# Patient Record
Sex: FEMALE | Race: WHITE | NOT HISPANIC OR LATINO | Employment: FULL TIME | URBAN - NONMETROPOLITAN AREA
[De-identification: names, ages, dates, MRNs, and addresses within clinical notes are randomized per-mention and may not be internally consistent; named-entity substitution may affect disease eponyms.]

---

## 2017-04-03 ENCOUNTER — TRANSCRIBE ORDERS (OUTPATIENT)
Dept: ADMINISTRATIVE | Facility: HOSPITAL | Age: 57
End: 2017-04-03

## 2017-04-03 DIAGNOSIS — R42 DIZZINESS: Primary | ICD-10-CM

## 2017-04-11 ENCOUNTER — HOSPITAL ENCOUNTER (OUTPATIENT)
Dept: MRI IMAGING | Facility: HOSPITAL | Age: 57
Discharge: HOME | End: 2017-04-11
Attending: PSYCHIATRY & NEUROLOGY

## 2017-04-11 DIAGNOSIS — R42 DIZZINESS: ICD-10-CM

## 2017-09-28 ENCOUNTER — TRANSCRIBE ORDERS (OUTPATIENT)
Dept: ADMINISTRATIVE | Facility: HOSPITAL | Age: 57
End: 2017-09-28

## 2017-09-28 DIAGNOSIS — G93.9 BRAIN DISORDER: Primary | ICD-10-CM

## 2017-10-05 ENCOUNTER — HOSPITAL ENCOUNTER (OUTPATIENT)
Dept: CT IMAGING | Facility: HOSPITAL | Age: 57
Discharge: HOME | End: 2017-10-05
Attending: FAMILY MEDICINE | Admitting: FAMILY MEDICINE

## 2017-10-05 DIAGNOSIS — G93.9 BRAIN DISORDER: ICD-10-CM

## 2017-10-05 LAB — CREAT BLDA-MCNC: 0.8 MG/DL (ref 0.6–1.3)

## 2017-10-05 PROCEDURE — 70470 CT HEAD/BRAIN W/O & W/DYE: CPT | Performed by: RADIOLOGY

## 2017-10-05 PROCEDURE — 82565 ASSAY OF CREATININE: CPT

## 2017-10-05 PROCEDURE — 70470 CT HEAD/BRAIN W/O & W/DYE: CPT

## 2017-10-05 PROCEDURE — 0 IOPAMIDOL 61 % SOLUTION: Performed by: FAMILY MEDICINE

## 2017-10-05 RX ADMIN — IOPAMIDOL 100 ML: 612 INJECTION, SOLUTION INTRAVENOUS at 15:15

## 2018-02-04 ENCOUNTER — HOSPITAL ENCOUNTER (EMERGENCY)
Facility: HOSPITAL | Age: 58
Discharge: HOME | End: 2018-02-04
Attending: EMERGENCY MEDICINE | Admitting: EMERGENCY MEDICINE

## 2018-02-04 ENCOUNTER — APPOINTMENT (OUTPATIENT)
Dept: CT IMAGING | Facility: HOSPITAL | Age: 58
End: 2018-02-04

## 2018-02-04 ENCOUNTER — APPOINTMENT (OUTPATIENT)
Dept: GENERAL RADIOLOGY | Facility: HOSPITAL | Age: 58
End: 2018-02-04

## 2018-02-04 VITALS
TEMPERATURE: 98.4 F | SYSTOLIC BLOOD PRESSURE: 128 MMHG | RESPIRATION RATE: 18 BRPM | OXYGEN SATURATION: 96 % | HEIGHT: 67 IN | WEIGHT: 265 LBS | BODY MASS INDEX: 41.59 KG/M2 | DIASTOLIC BLOOD PRESSURE: 85 MMHG | HEART RATE: 103 BPM

## 2018-02-04 DIAGNOSIS — J11.1 INFLUENZA: Primary | ICD-10-CM

## 2018-02-04 LAB
A-A DO2: 29.6 MMHG (ref 0–300)
ALBUMIN SERPL-MCNC: 5 G/DL (ref 3.5–5)
ALBUMIN/GLOB SERPL: 1.7 G/DL (ref 1.5–2.5)
ALP SERPL-CCNC: 75 U/L (ref 35–104)
ALT SERPL W P-5'-P-CCNC: 32 U/L (ref 10–36)
ANION GAP SERPL CALCULATED.3IONS-SCNC: 7.7 MMOL/L (ref 3.6–11.2)
ARTERIAL PATENCY WRIST A: POSITIVE
AST SERPL-CCNC: 58 U/L (ref 10–30)
ATMOSPHERIC PRESS: 720 MMHG
BASE EXCESS BLDA CALC-SCNC: 4.1 MMOL/L
BASOPHILS # BLD AUTO: 0.02 10*3/MM3 (ref 0–0.3)
BASOPHILS NFR BLD AUTO: 0.3 % (ref 0–2)
BDY SITE: ABNORMAL
BILIRUB SERPL-MCNC: 0.6 MG/DL (ref 0.2–1.8)
BODY TEMPERATURE: 98.6 C
BUN BLD-MCNC: 12 MG/DL (ref 7–21)
BUN/CREAT SERPL: 14.3 (ref 7–25)
CALCIUM SPEC-SCNC: 9.5 MG/DL (ref 7.7–10)
CHLORIDE SERPL-SCNC: 99 MMOL/L (ref 99–112)
CO2 SERPL-SCNC: 28.3 MMOL/L (ref 24.3–31.9)
COHGB MFR BLD: 1.5 % (ref 0–5)
CREAT BLD-MCNC: 0.84 MG/DL (ref 0.43–1.29)
DEPRECATED RDW RBC AUTO: 49.4 FL (ref 37–54)
EOSINOPHIL # BLD AUTO: 0.15 10*3/MM3 (ref 0–0.7)
EOSINOPHIL NFR BLD AUTO: 2.3 % (ref 0–5)
ERYTHROCYTE [DISTWIDTH] IN BLOOD BY AUTOMATED COUNT: 15.5 % (ref 11.5–14.5)
FLUAV AG NPH QL: POSITIVE
FLUBV AG NPH QL IA: NEGATIVE
GFR SERPL CREATININE-BSD FRML MDRD: 70 ML/MIN/1.73
GLOBULIN UR ELPH-MCNC: 2.9 GM/DL
GLUCOSE BLD-MCNC: 94 MG/DL (ref 70–110)
HCO3 BLDA-SCNC: 28.9 MMOL/L (ref 22–26)
HCT VFR BLD AUTO: 44.1 % (ref 37–47)
HCT VFR BLD CALC: 43 % (ref 37–47)
HGB BLD-MCNC: 14.6 G/DL (ref 12–16)
HGB BLDA-MCNC: 14.6 G/DL (ref 12–16)
HOROWITZ INDEX BLD+IHG-RTO: 21 %
IMM GRANULOCYTES # BLD: 0.04 10*3/MM3 (ref 0–0.03)
IMM GRANULOCYTES NFR BLD: 0.6 % (ref 0–0.5)
LYMPHOCYTES # BLD AUTO: 1 10*3/MM3 (ref 1–3)
LYMPHOCYTES NFR BLD AUTO: 15.5 % (ref 21–51)
MCH RBC QN AUTO: 28.9 PG (ref 27–33)
MCHC RBC AUTO-ENTMCNC: 33.1 G/DL (ref 33–37)
MCV RBC AUTO: 87.2 FL (ref 80–94)
METHGB BLD QL: 0.3 % (ref 0–3)
MODALITY: ABNORMAL
MONOCYTES # BLD AUTO: 0.91 10*3/MM3 (ref 0.1–0.9)
MONOCYTES NFR BLD AUTO: 14.1 % (ref 0–10)
NEUTROPHILS # BLD AUTO: 4.35 10*3/MM3 (ref 1.4–6.5)
NEUTROPHILS NFR BLD AUTO: 67.2 % (ref 30–70)
OSMOLALITY SERPL CALC.SUM OF ELEC: 269.6 MOSM/KG (ref 273–305)
OXYHGB MFR BLDV: 90.1 % (ref 85–100)
PCO2 BLDA: 43.6 MM HG (ref 35–45)
PH BLDA: 7.44 PH UNITS (ref 7.35–7.45)
PLATELET # BLD AUTO: 162 10*3/MM3 (ref 130–400)
PMV BLD AUTO: 11.6 FL (ref 6–10)
PO2 BLDA: 59.5 MM HG (ref 80–100)
POTASSIUM BLD-SCNC: 4.5 MMOL/L (ref 3.5–5.3)
PROT SERPL-MCNC: 7.9 G/DL (ref 6–8)
RBC # BLD AUTO: 5.06 10*6/MM3 (ref 4.2–5.4)
SAO2 % BLDCOA: 91.8 % (ref 90–100)
SODIUM BLD-SCNC: 135 MMOL/L (ref 135–153)
WBC NRBC COR # BLD: 6.47 10*3/MM3 (ref 4.5–12.5)

## 2018-02-04 PROCEDURE — 93005 ELECTROCARDIOGRAM TRACING: CPT | Performed by: EMERGENCY MEDICINE

## 2018-02-04 PROCEDURE — 83050 HGB METHEMOGLOBIN QUAN: CPT | Performed by: EMERGENCY MEDICINE

## 2018-02-04 PROCEDURE — 82805 BLOOD GASES W/O2 SATURATION: CPT | Performed by: EMERGENCY MEDICINE

## 2018-02-04 PROCEDURE — 87804 INFLUENZA ASSAY W/OPTIC: CPT | Performed by: EMERGENCY MEDICINE

## 2018-02-04 PROCEDURE — 71046 X-RAY EXAM CHEST 2 VIEWS: CPT

## 2018-02-04 PROCEDURE — 70450 CT HEAD/BRAIN W/O DYE: CPT | Performed by: RADIOLOGY

## 2018-02-04 PROCEDURE — 70486 CT MAXILLOFACIAL W/O DYE: CPT

## 2018-02-04 PROCEDURE — 25010000002 AZITHROMYCIN: Performed by: EMERGENCY MEDICINE

## 2018-02-04 PROCEDURE — 80053 COMPREHEN METABOLIC PANEL: CPT | Performed by: EMERGENCY MEDICINE

## 2018-02-04 PROCEDURE — 36415 COLL VENOUS BLD VENIPUNCTURE: CPT

## 2018-02-04 PROCEDURE — 96365 THER/PROPH/DIAG IV INF INIT: CPT

## 2018-02-04 PROCEDURE — 36600 WITHDRAWAL OF ARTERIAL BLOOD: CPT | Performed by: EMERGENCY MEDICINE

## 2018-02-04 PROCEDURE — 99283 EMERGENCY DEPT VISIT LOW MDM: CPT

## 2018-02-04 PROCEDURE — 87040 BLOOD CULTURE FOR BACTERIA: CPT | Performed by: EMERGENCY MEDICINE

## 2018-02-04 PROCEDURE — 71046 X-RAY EXAM CHEST 2 VIEWS: CPT | Performed by: RADIOLOGY

## 2018-02-04 PROCEDURE — 82375 ASSAY CARBOXYHB QUANT: CPT | Performed by: EMERGENCY MEDICINE

## 2018-02-04 PROCEDURE — 85025 COMPLETE CBC W/AUTO DIFF WBC: CPT | Performed by: EMERGENCY MEDICINE

## 2018-02-04 PROCEDURE — 70450 CT HEAD/BRAIN W/O DYE: CPT

## 2018-02-04 PROCEDURE — 70486 CT MAXILLOFACIAL W/O DYE: CPT | Performed by: RADIOLOGY

## 2018-02-04 RX ORDER — BUMETANIDE 1 MG/1
1 TABLET ORAL DAILY
COMMUNITY
End: 2018-11-01 | Stop reason: HOSPADM

## 2018-02-04 RX ORDER — SODIUM CHLORIDE 0.9 % (FLUSH) 0.9 %
10 SYRINGE (ML) INJECTION AS NEEDED
Status: DISCONTINUED | OUTPATIENT
Start: 2018-02-04 | End: 2018-02-04 | Stop reason: HOSPADM

## 2018-02-04 RX ORDER — AMOXICILLIN AND CLAVULANATE POTASSIUM 875; 125 MG/1; MG/1
1 TABLET, FILM COATED ORAL 2 TIMES DAILY
COMMUNITY
End: 2018-11-01 | Stop reason: HOSPADM

## 2018-02-04 RX ORDER — TRAZODONE HYDROCHLORIDE 100 MG/1
100 TABLET ORAL NIGHTLY
COMMUNITY
End: 2021-11-17

## 2018-02-04 RX ORDER — LEVETIRACETAM 500 MG/1
500 TABLET ORAL 2 TIMES DAILY
COMMUNITY
End: 2018-11-01 | Stop reason: HOSPADM

## 2018-02-04 RX ORDER — GUAIFENESIN AND CODEINE PHOSPHATE 100; 10 MG/5ML; MG/5ML
10 SOLUTION ORAL 4 TIMES DAILY PRN
Qty: 120 ML | Refills: 0 | Status: SHIPPED | OUTPATIENT
Start: 2018-02-04 | End: 2018-11-01 | Stop reason: HOSPADM

## 2018-02-04 RX ADMIN — AZITHROMYCIN 500 MG: 500 INJECTION, POWDER, LYOPHILIZED, FOR SOLUTION INTRAVENOUS at 09:47

## 2018-02-04 NOTE — ED NOTES
EKG performed by [facility] at 0821 and shown to Dr Hardin.      Britt Mathew  02/04/18 0849

## 2018-02-04 NOTE — ED PROVIDER NOTES
Subjective   History of Present Illness  57-year-old white female complains of headache and cough.  Patient states that she has bilateral facial pain due to sinus infection.  She saw her doctor and was started on amoxicillin and nasal decongestant by the NP 3 days ago.  Her symptoms have continued to worsen despite treatment.  She complains of frequent cough and now has pain in the top of her head.  She states that she's had a benign brain tumor and she is concerned about this in light of her new headache.  She had a negative flu test at the doctor's office.  She had a fever at home 2 days ago.  She denies any chest pain, neck pain or stiffness, vomiting, diarrhea or other complaints.  Review of Systems   All other systems reviewed and are negative.      Past Medical History:   Diagnosis Date   • Anxiety    • Brain tumor    • Brain tumor    • C. difficile colitis    • Disease of thyroid gland     s/p thyroidectomy    • Dyspnea    • GERD (gastroesophageal reflux disease)    • Hemorrhoids    • Hypertension    • Kidney infection     treated recently-completed all antibiotics    • Loss, vision, sudden     episodes of vision loss-'black out vision'-reason unknown    • Near syncope     reason unknown    • Nerve pain    • Rash     this summer-rash of unknown origin-has used a cream prn    • Skin rash     BLE/BUE with scattered red bumps, pt states present since Spring 2016   • Swelling of ankle    • Vitamin D deficiency        Allergies   Allergen Reactions   • Poison Ivy Extract [Poison Ivy Extract] Other (See Comments)     Skin blisters     • Poison Oak Extract [Poison Oak Extract] Other (See Comments)     Skin blisters        Past Surgical History:   Procedure Laterality Date   •  SECTION     • COLONOSCOPY W/ POLYPECTOMY     • HYSTERECTOMY     • OTHER SURGICAL HISTORY      right lateral femoral cutaneous nerve transection at the inguinal ligament    • PERONEAL NERVE DECOMPRESSION Right 2016     Procedure: RIGHT LATERAL FEMORAL CUTANEOUS NERVE TRANSECTION AT LIGUINAL LIGAMENT;  Surgeon: Ethan Tate MD;  Location: [facility] OR;  Service:    • THYROID SURGERY      removed because 'large'   • TONSILLECTOMY         History reviewed. No pertinent family history.    Social History     Social History   • Marital status:      Spouse name: N/A   • Number of children: N/A   • Years of education: N/A     Social History Main Topics   • Smoking status: Never Smoker   • Smokeless tobacco: Never Used   • Alcohol use No   • Drug use: No   • Sexual activity: Defer     Other Topics Concern   • None     Social History Narrative   • None           Objective   Physical Exam   Constitutional: She is oriented to person, place, and time. She appears well-developed and well-nourished.   HENT:   Head: Normocephalic and atraumatic.   Right Ear: External ear and ear canal normal. A middle ear effusion is present.   Left Ear: Tympanic membrane, external ear and ear canal normal.   Mouth/Throat: Oropharynx is clear and moist.   Cardiovascular: Normal rate, regular rhythm and normal heart sounds.  Exam reveals no gallop and no friction rub.    No murmur heard.  Pulmonary/Chest: Effort normal and breath sounds normal. No respiratory distress. She has no wheezes. She has no rales.   Abdominal: Soft. Bowel sounds are normal. She exhibits no distension. There is no tenderness. There is no rebound and no guarding.   Musculoskeletal: Normal range of motion. She exhibits no edema.   Neurological: She is alert and oriented to person, place, and time.   Skin: Skin is warm and dry.   Psychiatric: She has a normal mood and affect.   Nursing note and vitals reviewed.      Procedures  Results for orders placed or performed during the hospital encounter of 02/04/18   Influenza Antigen, Rapid - Swab, Nasopharynx   Result Value Ref Range    Influenza A Ag, EIA Positive (A) Negative    Influenza B Ag, EIA Negative Negative   Comprehensive  Metabolic Panel   Result Value Ref Range    Glucose 94 70 - 110 mg/dL    BUN 12 7 - 21 mg/dL    Creatinine 0.84 0.43 - 1.29 mg/dL    Sodium 135 135 - 153 mmol/L    Potassium 4.5 3.5 - 5.3 mmol/L    Chloride 99 99 - 112 mmol/L    CO2 28.3 24.3 - 31.9 mmol/L    Calcium 9.5 7.7 - 10.0 mg/dL    Total Protein 7.9 6.0 - 8.0 g/dL    Albumin 5.00 3.50 - 5.00 g/dL    ALT (SGPT) 32 10 - 36 U/L    AST (SGOT) 58 (H) 10 - 30 U/L    Alkaline Phosphatase 75 35 - 104 U/L    Total Bilirubin 0.6 0.2 - 1.8 mg/dL    eGFR Non African Amer 70 >60 mL/min/1.73    Globulin 2.9 gm/dL    A/G Ratio 1.7 1.5 - 2.5 g/dL    BUN/Creatinine Ratio 14.3 7.0 - 25.0    Anion Gap 7.7 3.6 - 11.2 mmol/L   Blood Gas, Arterial   Result Value Ref Range    Site Arterial: left radial     Tyler's Test Positive     pH, Arterial 7.439 7.350 - 7.450 pH units    pCO2, Arterial 43.6 35.0 - 45.0 mm Hg    pO2, Arterial 59.5 (L) 80.0 - 100.0 mm Hg    HCO3, Arterial 28.9 (H) 22.0 - 26.0 mmol/L    Base Excess, Arterial 4.1 mmol/L    O2 Saturation, Arterial 91.8 90.0 - 100.0 %    Hemoglobin, Blood Gas 14.6 12 - 16 g/dL    Hematocrit, Blood Gas 43.0 37.0 - 47.0 %    Oxyhemoglobin 90.1 85 - 100 %    Methemoglobin 0.30 0.00 - 3.00 %    Carboxyhemoglobin 1.5 0 - 5 %    A-a Gradiant 29.6 0.0 - 300.0 mmHg    Temperature 98.6 C    Barometric Pressure for Blood Gas 720 mmHg    Modality Nasal Cannula     FIO2 21 %   CBC Auto Differential   Result Value Ref Range    WBC 6.47 4.50 - 12.50 10*3/mm3    RBC 5.06 4.20 - 5.40 10*6/mm3    Hemoglobin 14.6 12.0 - 16.0 g/dL    Hematocrit 44.1 37.0 - 47.0 %    MCV 87.2 80.0 - 94.0 fL    MCH 28.9 27.0 - 33.0 pg    MCHC 33.1 33.0 - 37.0 g/dL    RDW 15.5 (H) 11.5 - 14.5 %    RDW-SD 49.4 37.0 - 54.0 fl    MPV 11.6 (H) 6.0 - 10.0 fL    Platelets 162 130 - 400 10*3/mm3    Neutrophil % 67.2 30.0 - 70.0 %    Lymphocyte % 15.5 (L) 21.0 - 51.0 %    Monocyte % 14.1 (H) 0.0 - 10.0 %    Eosinophil % 2.3 0.0 - 5.0 %    Basophil % 0.3 0.0 - 2.0 %    Immature  Grans % 0.6 (H) 0.0 - 0.5 %    Neutrophils, Absolute 4.35 1.40 - 6.50 10*3/mm3    Lymphocytes, Absolute 1.00 1.00 - 3.00 10*3/mm3    Monocytes, Absolute 0.91 (H) 0.10 - 0.90 10*3/mm3    Eosinophils, Absolute 0.15 0.00 - 0.70 10*3/mm3    Basophils, Absolute 0.02 0.00 - 0.30 10*3/mm3    Immature Grans, Absolute 0.04 (H) 0.00 - 0.03 10*3/mm3   Osmolality, Calculated   Result Value Ref Range    Osmolality Calc 269.6 (L) 273.0 - 305.0 mOsm/kg     Xr Chest 2 View    Result Date: 2/4/2018  Narrative: EXAMINATION: XR CHEST 2 VW-  CLINICAL INDICATION:     cough, fever  TECHNIQUE:  XR CHEST 2 VW-  COMPARISON: NONE  FINDINGS: Lungs are aerated. Heart and mediastinal contours are unremarkable. No pneumothorax. No pleural effusion. No acute osseous findings.         Impression: No radiographic evidence of acute cardiac or pulmonary disease.  This report was finalized on 2/4/2018 9:33 AM by Dr. Tucker Kuhn MD.      Ct Head Without Contrast    Result Date: 2/4/2018  Narrative: EXAMINATION: CT HEAD WO CONTRAST-  CLINICAL INDICATION:     ha  COMPARISON:    10/05/2017  Technique: Multiple CT axial images were obtained through the level of the brain without IV contrast administration. Reformatted images in the coronal and/or sagittal plane(s) were generated from the axial data set to facilitate diagnostic accuracy and/or surgical planning.  Radiation dose reduction techniques were utilized per ALARA protocol. Automated exposure control was initiated through either or CareDose or DoseRight software packages by  protocol.   DOSE (DLP mGy-cm): 1173.4 mGy.cm  FINDINGS:   No acute intracranial abnormality. No midline shift or mass effect. No hydrocephalus or intracranial hemorrhage. There is no CT evidence of acute vascular territory infarct.  Bone windows show no acute osseous abnormality.      Impression: No CT evidence of acute intracranial abnormality.  This report was finalized on 2/4/2018 9:36 AM by Dr. Tucker Kuhn  MD.      Ct Sinus Without Contrast    Result Date: 2/4/2018  Narrative: EXAMINATION: CT SINUS WO CONTRAST-  Technique: Multiple CT coronal images were obtained through the level of the paranasal sinuses.  Radiation dose reduction techniques were utilized per ALARA protocol. Automated exposure control was initiated through either or SimpleCrew or DoseRigEximSoft-Trianz software packages by  protocol.   263.7 mGy.cm  CLINICAL INDICATION: Cough fever.  COMPARISON:    None.  FINDINGS: No air-fluid levels of the paranasal sinuses to indicate acute sinusitis. Some underdevelopment of the frontal sinuses noted constituting anatomic variant. Minimal degree of bony nasal septal deviation to the right. The orbits are symmetric in both globes appear intact. No destructive osseous changes. Mastoid air cells are clear. No significant mucoperiosteal thickening identified. The soft tissues are within normal limits.      Impression: Unremarkable examination of the sinuses. Other nonacute findings as above.  This report was finalized on 2/4/2018 9:35 AM by Dr. Tucker Kuhn MD.             ED Course  ED Course   Value Comment By Time   ECG 12 Lead Normal sinus rhythm.  Rate 100.  Q waves in lead III.  No ST elevations or depressions. Dawson Hardin MD 02/04 1006                  MDM  Number of Diagnoses or Management Options  Influenza:      Amount and/or Complexity of Data Reviewed  Clinical lab tests: reviewed  Tests in the radiology section of CPT®: reviewed  Tests in the medicine section of CPT®: reviewed  Independent visualization of images, tracings, or specimens: yes    Risk of Complications, Morbidity, and/or Mortality  Presenting problems: high  Diagnostic procedures: high  Management options: moderate        Final diagnoses:   Influenza            Dawson Hardin MD  02/04/18 1030

## 2018-02-09 LAB
BACTERIA SPEC AEROBE CULT: NORMAL
BACTERIA SPEC AEROBE CULT: NORMAL

## 2018-10-30 ENCOUNTER — APPOINTMENT (OUTPATIENT)
Dept: CT IMAGING | Facility: HOSPITAL | Age: 58
End: 2018-10-30

## 2018-10-30 ENCOUNTER — APPOINTMENT (OUTPATIENT)
Dept: GENERAL RADIOLOGY | Facility: HOSPITAL | Age: 58
End: 2018-10-30

## 2018-10-30 ENCOUNTER — HOSPITAL ENCOUNTER (EMERGENCY)
Facility: HOSPITAL | Age: 58
Discharge: ANOTHER HOSPITAL | End: 2018-10-30
Attending: EMERGENCY MEDICINE | Admitting: EMERGENCY MEDICINE

## 2018-10-30 ENCOUNTER — HOSPITAL ENCOUNTER (OUTPATIENT)
Facility: HOSPITAL | Age: 58
Setting detail: OBSERVATION
Discharge: HOME | End: 2018-11-01
Attending: FAMILY MEDICINE | Admitting: HOSPITALIST

## 2018-10-30 ENCOUNTER — APPOINTMENT (OUTPATIENT)
Dept: MRI IMAGING | Facility: HOSPITAL | Age: 58
End: 2018-10-30

## 2018-10-30 VITALS
SYSTOLIC BLOOD PRESSURE: 145 MMHG | HEART RATE: 78 BPM | TEMPERATURE: 97.4 F | OXYGEN SATURATION: 99 % | HEIGHT: 67 IN | RESPIRATION RATE: 17 BRPM | WEIGHT: 265 LBS | DIASTOLIC BLOOD PRESSURE: 88 MMHG | BODY MASS INDEX: 41.59 KG/M2

## 2018-10-30 DIAGNOSIS — G43.109 COMPLICATED MIGRAINE: Primary | ICD-10-CM

## 2018-10-30 DIAGNOSIS — R29.90 STROKE-LIKE SYMPTOMS: Primary | ICD-10-CM

## 2018-10-30 PROBLEM — R56.9 SEIZURE: Status: ACTIVE | Noted: 2018-10-30

## 2018-10-30 PROBLEM — E03.9 HYPOTHYROIDISM: Status: ACTIVE | Noted: 2018-10-30

## 2018-10-30 PROBLEM — R51.9 HEADACHE: Status: ACTIVE | Noted: 2018-10-30

## 2018-10-30 LAB
ALBUMIN SERPL-MCNC: 4.8 G/DL (ref 3.5–5)
ALBUMIN/GLOB SERPL: 1.8 G/DL (ref 1.5–2.5)
ALP SERPL-CCNC: 101 U/L (ref 35–104)
ALT SERPL W P-5'-P-CCNC: 40 U/L (ref 10–36)
ANION GAP SERPL CALCULATED.3IONS-SCNC: 8.4 MMOL/L (ref 3.6–11.2)
APTT PPP: 29.2 SECONDS (ref 23.8–36.1)
AST SERPL-CCNC: 31 U/L (ref 10–30)
BACTERIA UR QL AUTO: NORMAL /HPF
BASOPHILS # BLD AUTO: 0.05 10*3/MM3 (ref 0–0.3)
BASOPHILS NFR BLD AUTO: 0.5 % (ref 0–2)
BILIRUB SERPL-MCNC: 0.5 MG/DL (ref 0.2–1.8)
BILIRUB UR QL STRIP: NEGATIVE
BUN BLD-MCNC: 14 MG/DL (ref 7–21)
BUN/CREAT SERPL: 18.4 (ref 7–25)
CALCIUM SPEC-SCNC: 9 MG/DL (ref 7.7–10)
CHLORIDE SERPL-SCNC: 105 MMOL/L (ref 99–112)
CLARITY UR: CLEAR
CO2 SERPL-SCNC: 28.6 MMOL/L (ref 24.3–31.9)
COLOR UR: YELLOW
CREAT BLD-MCNC: 0.76 MG/DL (ref 0.43–1.29)
DEPRECATED RDW RBC AUTO: 49.1 FL (ref 37–54)
EOSINOPHIL # BLD AUTO: 0.19 10*3/MM3 (ref 0–0.7)
EOSINOPHIL NFR BLD AUTO: 2.1 % (ref 0–5)
ERYTHROCYTE [DISTWIDTH] IN BLOOD BY AUTOMATED COUNT: 15.4 % (ref 11.5–14.5)
GFR SERPL CREATININE-BSD FRML MDRD: 78 ML/MIN/1.73
GLOBULIN UR ELPH-MCNC: 2.6 GM/DL
GLUCOSE BLD-MCNC: 127 MG/DL (ref 70–110)
GLUCOSE UR STRIP-MCNC: NEGATIVE MG/DL
HCT VFR BLD AUTO: 42.1 % (ref 37–47)
HGB BLD-MCNC: 13.1 G/DL (ref 12–16)
HGB UR QL STRIP.AUTO: NEGATIVE
HYALINE CASTS UR QL AUTO: NORMAL /LPF
IMM GRANULOCYTES # BLD: 0.05 10*3/MM3 (ref 0–0.03)
IMM GRANULOCYTES NFR BLD: 0.5 % (ref 0–0.5)
INR PPP: 0.93 (ref 0.9–1.1)
KETONES UR QL STRIP: NEGATIVE
LEUKOCYTE ESTERASE UR QL STRIP.AUTO: ABNORMAL
LIPASE SERPL-CCNC: 37 U/L (ref 13–60)
LYMPHOCYTES # BLD AUTO: 1.73 10*3/MM3 (ref 1–3)
LYMPHOCYTES NFR BLD AUTO: 18.9 % (ref 21–51)
MCH RBC QN AUTO: 27.4 PG (ref 27–33)
MCHC RBC AUTO-ENTMCNC: 31.1 G/DL (ref 33–37)
MCV RBC AUTO: 88.1 FL (ref 80–94)
MONOCYTES # BLD AUTO: 0.56 10*3/MM3 (ref 0.1–0.9)
MONOCYTES NFR BLD AUTO: 6.1 % (ref 0–10)
NEUTROPHILS # BLD AUTO: 6.58 10*3/MM3 (ref 1.4–6.5)
NEUTROPHILS NFR BLD AUTO: 71.9 % (ref 30–70)
NITRITE UR QL STRIP: NEGATIVE
OSMOLALITY SERPL CALC.SUM OF ELEC: 285.2 MOSM/KG (ref 273–305)
PH UR STRIP.AUTO: <=5 [PH] (ref 5–8)
PLATELET # BLD AUTO: 205 10*3/MM3 (ref 130–400)
PMV BLD AUTO: 10.7 FL (ref 6–10)
POTASSIUM BLD-SCNC: 3.6 MMOL/L (ref 3.5–5.3)
PROT SERPL-MCNC: 7.4 G/DL (ref 6–8)
PROT UR QL STRIP: NEGATIVE
PROTHROMBIN TIME: 12.7 SECONDS (ref 11–15.4)
RBC # BLD AUTO: 4.78 10*6/MM3 (ref 4.2–5.4)
RBC # UR: NORMAL /HPF
REF LAB TEST METHOD: NORMAL
SODIUM BLD-SCNC: 142 MMOL/L (ref 135–153)
SP GR UR STRIP: 1.01 (ref 1–1.03)
SQUAMOUS #/AREA URNS HPF: NORMAL /HPF
TROPONIN I SERPL-MCNC: <0.006 NG/ML
UROBILINOGEN UR QL STRIP: ABNORMAL
WBC NRBC COR # BLD: 9.16 10*3/MM3 (ref 4.5–12.5)
WBC UR QL AUTO: NORMAL /HPF

## 2018-10-30 PROCEDURE — 85610 PROTHROMBIN TIME: CPT | Performed by: EMERGENCY MEDICINE

## 2018-10-30 PROCEDURE — 83690 ASSAY OF LIPASE: CPT | Performed by: EMERGENCY MEDICINE

## 2018-10-30 PROCEDURE — 96372 THER/PROPH/DIAG INJ SC/IM: CPT

## 2018-10-30 PROCEDURE — 71045 X-RAY EXAM CHEST 1 VIEW: CPT | Performed by: RADIOLOGY

## 2018-10-30 PROCEDURE — 70450 CT HEAD/BRAIN W/O DYE: CPT | Performed by: RADIOLOGY

## 2018-10-30 PROCEDURE — 85025 COMPLETE CBC W/AUTO DIFF WBC: CPT | Performed by: EMERGENCY MEDICINE

## 2018-10-30 PROCEDURE — 85730 THROMBOPLASTIN TIME PARTIAL: CPT | Performed by: EMERGENCY MEDICINE

## 2018-10-30 PROCEDURE — 96361 HYDRATE IV INFUSION ADD-ON: CPT

## 2018-10-30 PROCEDURE — 25010000002 HEPARIN (PORCINE) PER 1000 UNITS: Performed by: HOSPITALIST

## 2018-10-30 PROCEDURE — 93010 ELECTROCARDIOGRAM REPORT: CPT | Performed by: INTERNAL MEDICINE

## 2018-10-30 PROCEDURE — 96374 THER/PROPH/DIAG INJ IV PUSH: CPT

## 2018-10-30 PROCEDURE — 80053 COMPREHEN METABOLIC PANEL: CPT | Performed by: EMERGENCY MEDICINE

## 2018-10-30 PROCEDURE — G0378 HOSPITAL OBSERVATION PER HR: HCPCS

## 2018-10-30 PROCEDURE — 25010000002 ONDANSETRON PER 1 MG: Performed by: EMERGENCY MEDICINE

## 2018-10-30 PROCEDURE — 25010000002 MORPHINE PER 10 MG: Performed by: EMERGENCY MEDICINE

## 2018-10-30 PROCEDURE — 25010000002 LORAZEPAM PER 2 MG: Performed by: NURSE PRACTITIONER

## 2018-10-30 PROCEDURE — 99285 EMERGENCY DEPT VISIT HI MDM: CPT

## 2018-10-30 PROCEDURE — 81001 URINALYSIS AUTO W/SCOPE: CPT | Performed by: EMERGENCY MEDICINE

## 2018-10-30 PROCEDURE — 84484 ASSAY OF TROPONIN QUANT: CPT | Performed by: EMERGENCY MEDICINE

## 2018-10-30 PROCEDURE — 70450 CT HEAD/BRAIN W/O DYE: CPT

## 2018-10-30 PROCEDURE — 96375 TX/PRO/DX INJ NEW DRUG ADDON: CPT

## 2018-10-30 PROCEDURE — 25010000002 LORAZEPAM PER 2 MG: Performed by: HOSPITALIST

## 2018-10-30 PROCEDURE — 71045 X-RAY EXAM CHEST 1 VIEW: CPT

## 2018-10-30 PROCEDURE — 70553 MRI BRAIN STEM W/O & W/DYE: CPT

## 2018-10-30 PROCEDURE — G0379 DIRECT REFER HOSPITAL OBSERV: HCPCS

## 2018-10-30 PROCEDURE — 99220 PR INITIAL OBSERVATION CARE/DAY 70 MINUTES: CPT | Performed by: HOSPITALIST

## 2018-10-30 PROCEDURE — 93005 ELECTROCARDIOGRAM TRACING: CPT | Performed by: EMERGENCY MEDICINE

## 2018-10-30 RX ORDER — HEPARIN SODIUM 5000 [USP'U]/ML
5000 INJECTION, SOLUTION INTRAVENOUS; SUBCUTANEOUS EVERY 8 HOURS SCHEDULED
Status: DISCONTINUED | OUTPATIENT
Start: 2018-10-30 | End: 2018-11-01 | Stop reason: HOSPADM

## 2018-10-30 RX ORDER — SODIUM CHLORIDE 9 MG/ML
100 INJECTION, SOLUTION INTRAVENOUS CONTINUOUS
Status: DISCONTINUED | OUTPATIENT
Start: 2018-10-30 | End: 2018-10-31

## 2018-10-30 RX ORDER — SODIUM CHLORIDE 0.9 % (FLUSH) 0.9 %
3-10 SYRINGE (ML) INJECTION AS NEEDED
Status: DISCONTINUED | OUTPATIENT
Start: 2018-10-30 | End: 2018-11-01 | Stop reason: HOSPADM

## 2018-10-30 RX ORDER — PANTOPRAZOLE SODIUM 40 MG/1
40 TABLET, DELAYED RELEASE ORAL EVERY MORNING
Status: DISCONTINUED | OUTPATIENT
Start: 2018-10-31 | End: 2018-10-31

## 2018-10-30 RX ORDER — LORAZEPAM 2 MG/ML
0.5 INJECTION INTRAMUSCULAR ONCE
Status: COMPLETED | OUTPATIENT
Start: 2018-10-30 | End: 2018-10-30

## 2018-10-30 RX ORDER — MORPHINE SULFATE 2 MG/ML
2 INJECTION, SOLUTION INTRAMUSCULAR; INTRAVENOUS ONCE
Status: COMPLETED | OUTPATIENT
Start: 2018-10-30 | End: 2018-10-30

## 2018-10-30 RX ORDER — MORPHINE SULFATE 2 MG/ML
1 INJECTION, SOLUTION INTRAMUSCULAR; INTRAVENOUS EVERY 4 HOURS PRN
Status: DISCONTINUED | OUTPATIENT
Start: 2018-10-30 | End: 2018-11-01 | Stop reason: HOSPADM

## 2018-10-30 RX ORDER — LORAZEPAM 2 MG/ML
0.5 INJECTION INTRAMUSCULAR EVERY 4 HOURS PRN
Status: DISCONTINUED | OUTPATIENT
Start: 2018-10-30 | End: 2018-11-01 | Stop reason: HOSPADM

## 2018-10-30 RX ORDER — ASPIRIN 81 MG/1
324 TABLET, CHEWABLE ORAL ONCE
Status: COMPLETED | OUTPATIENT
Start: 2018-10-30 | End: 2018-10-30

## 2018-10-30 RX ORDER — LEVOTHYROXINE SODIUM 0.2 MG/1
200 TABLET ORAL
Status: DISCONTINUED | OUTPATIENT
Start: 2018-10-31 | End: 2018-11-01 | Stop reason: HOSPADM

## 2018-10-30 RX ORDER — NALOXONE HCL 0.4 MG/ML
0.4 VIAL (ML) INJECTION
Status: DISCONTINUED | OUTPATIENT
Start: 2018-10-30 | End: 2018-11-01 | Stop reason: HOSPADM

## 2018-10-30 RX ORDER — TRAZODONE HYDROCHLORIDE 50 MG/1
100 TABLET ORAL NIGHTLY
Status: DISCONTINUED | OUTPATIENT
Start: 2018-10-30 | End: 2018-10-31

## 2018-10-30 RX ORDER — SODIUM CHLORIDE 0.9 % (FLUSH) 0.9 %
3 SYRINGE (ML) INJECTION EVERY 12 HOURS SCHEDULED
Status: DISCONTINUED | OUTPATIENT
Start: 2018-10-30 | End: 2018-11-01 | Stop reason: HOSPADM

## 2018-10-30 RX ORDER — ASPIRIN 81 MG/1
81 TABLET ORAL DAILY
Status: DISCONTINUED | OUTPATIENT
Start: 2018-10-31 | End: 2018-11-01 | Stop reason: HOSPADM

## 2018-10-30 RX ORDER — DULOXETIN HYDROCHLORIDE 30 MG/1
30 CAPSULE, DELAYED RELEASE ORAL DAILY
Status: DISCONTINUED | OUTPATIENT
Start: 2018-10-30 | End: 2018-10-31

## 2018-10-30 RX ORDER — ONDANSETRON 2 MG/ML
4 INJECTION INTRAMUSCULAR; INTRAVENOUS ONCE
Status: COMPLETED | OUTPATIENT
Start: 2018-10-30 | End: 2018-10-30

## 2018-10-30 RX ORDER — ONDANSETRON 2 MG/ML
4 INJECTION INTRAMUSCULAR; INTRAVENOUS EVERY 6 HOURS PRN
Status: DISCONTINUED | OUTPATIENT
Start: 2018-10-30 | End: 2018-11-01 | Stop reason: HOSPADM

## 2018-10-30 RX ORDER — ONDANSETRON 4 MG/1
4 TABLET, FILM COATED ORAL EVERY 6 HOURS PRN
Status: DISCONTINUED | OUTPATIENT
Start: 2018-10-30 | End: 2018-11-01 | Stop reason: HOSPADM

## 2018-10-30 RX ORDER — PANTOPRAZOLE SODIUM 40 MG/10ML
40 INJECTION, POWDER, LYOPHILIZED, FOR SOLUTION INTRAVENOUS ONCE
Status: COMPLETED | OUTPATIENT
Start: 2018-10-30 | End: 2018-10-30

## 2018-10-30 RX ORDER — ATENOLOL 50 MG/1
50 TABLET ORAL DAILY
Status: DISCONTINUED | OUTPATIENT
Start: 2018-10-30 | End: 2018-10-31

## 2018-10-30 RX ORDER — ATORVASTATIN CALCIUM 40 MG/1
40 TABLET, FILM COATED ORAL NIGHTLY
Status: DISCONTINUED | OUTPATIENT
Start: 2018-10-30 | End: 2018-10-31

## 2018-10-30 RX ADMIN — MORPHINE SULFATE 2 MG: 2 INJECTION, SOLUTION INTRAMUSCULAR; INTRAVENOUS at 11:12

## 2018-10-30 RX ADMIN — ASPIRIN 324 MG: 81 TABLET, CHEWABLE ORAL at 12:41

## 2018-10-30 RX ADMIN — LORAZEPAM 0.5 MG: 2 INJECTION INTRAMUSCULAR; INTRAVENOUS at 21:34

## 2018-10-30 RX ADMIN — DULOXETINE HYDROCHLORIDE 30 MG: 30 CAPSULE, DELAYED RELEASE ORAL at 20:17

## 2018-10-30 RX ADMIN — ATENOLOL 50 MG: 50 TABLET ORAL at 20:17

## 2018-10-30 RX ADMIN — HEPARIN SODIUM 5000 UNITS: 5000 INJECTION, SOLUTION INTRAVENOUS; SUBCUTANEOUS at 20:17

## 2018-10-30 RX ADMIN — LORAZEPAM 0.5 MG: 2 INJECTION INTRAMUSCULAR; INTRAVENOUS at 22:32

## 2018-10-30 RX ADMIN — SODIUM CHLORIDE 1000 ML: 9 INJECTION, SOLUTION INTRAVENOUS at 11:12

## 2018-10-30 RX ADMIN — PANTOPRAZOLE SODIUM 40 MG: 40 INJECTION, POWDER, FOR SOLUTION INTRAVENOUS at 11:12

## 2018-10-30 RX ADMIN — SODIUM CHLORIDE 100 ML/HR: 9 INJECTION, SOLUTION INTRAVENOUS at 19:27

## 2018-10-30 RX ADMIN — TRAZODONE HYDROCHLORIDE 100 MG: 50 TABLET ORAL at 20:17

## 2018-10-30 RX ADMIN — ONDANSETRON 4 MG: 2 INJECTION, SOLUTION INTRAMUSCULAR; INTRAVENOUS at 11:12

## 2018-10-31 ENCOUNTER — APPOINTMENT (OUTPATIENT)
Dept: NEUROLOGY | Facility: HOSPITAL | Age: 58
End: 2018-10-31
Attending: HOSPITALIST

## 2018-10-31 PROBLEM — E66.01 OBESITY, CLASS III, BMI 40-49.9 (MORBID OBESITY) (HCC): Status: ACTIVE | Noted: 2018-10-31

## 2018-10-31 PROBLEM — E66.813 OBESITY, CLASS III, BMI 40-49.9 (MORBID OBESITY): Status: ACTIVE | Noted: 2018-10-31

## 2018-10-31 PROBLEM — G43.109 COMPLICATED MIGRAINE: Status: ACTIVE | Noted: 2018-10-31

## 2018-10-31 LAB
ANION GAP SERPL CALCULATED.3IONS-SCNC: 10 MMOL/L (ref 3–11)
ARTICHOKE IGE QN: 102 MG/DL (ref 0–130)
BUN BLD-MCNC: 15 MG/DL (ref 9–23)
BUN/CREAT SERPL: 18.3 (ref 7–25)
CALCIUM SPEC-SCNC: 8.2 MG/DL (ref 8.7–10.4)
CHLORIDE SERPL-SCNC: 102 MMOL/L (ref 99–109)
CHOLEST SERPL-MCNC: 148 MG/DL (ref 0–200)
CO2 SERPL-SCNC: 28 MMOL/L (ref 20–31)
CREAT BLD-MCNC: 0.82 MG/DL (ref 0.6–1.3)
GFR SERPL CREATININE-BSD FRML MDRD: 72 ML/MIN/1.73
GLUCOSE BLD-MCNC: 93 MG/DL (ref 70–100)
HDLC SERPL-MCNC: 31 MG/DL (ref 40–60)
POTASSIUM BLD-SCNC: 4.1 MMOL/L (ref 3.5–5.5)
SODIUM BLD-SCNC: 140 MMOL/L (ref 132–146)
TRIGL SERPL-MCNC: 144 MG/DL (ref 0–150)
TSH SERPL DL<=0.05 MIU/L-ACNC: 4 MIU/ML (ref 0.35–5.35)

## 2018-10-31 PROCEDURE — 96375 TX/PRO/DX INJ NEW DRUG ADDON: CPT

## 2018-10-31 PROCEDURE — 96376 TX/PRO/DX INJ SAME DRUG ADON: CPT

## 2018-10-31 PROCEDURE — 80061 LIPID PANEL: CPT | Performed by: HOSPITALIST

## 2018-10-31 PROCEDURE — 25010000002 METOCLOPRAMIDE PER 10 MG: Performed by: INTERNAL MEDICINE

## 2018-10-31 PROCEDURE — A9577 INJ MULTIHANCE: HCPCS | Performed by: FAMILY MEDICINE

## 2018-10-31 PROCEDURE — 99226 PR SBSQ OBSERVATION CARE/DAY 35 MINUTES: CPT | Performed by: INTERNAL MEDICINE

## 2018-10-31 PROCEDURE — 25010000002 KETOROLAC TROMETHAMINE PER 15 MG: Performed by: INTERNAL MEDICINE

## 2018-10-31 PROCEDURE — 25010000002 MORPHINE SULFATE (PF) 2 MG/ML SOLUTION: Performed by: HOSPITALIST

## 2018-10-31 PROCEDURE — 84443 ASSAY THYROID STIM HORMONE: CPT | Performed by: HOSPITALIST

## 2018-10-31 PROCEDURE — 25010000002 HEPARIN (PORCINE) PER 1000 UNITS: Performed by: HOSPITALIST

## 2018-10-31 PROCEDURE — 96361 HYDRATE IV INFUSION ADD-ON: CPT

## 2018-10-31 PROCEDURE — 80048 BASIC METABOLIC PNL TOTAL CA: CPT | Performed by: HOSPITALIST

## 2018-10-31 PROCEDURE — G0378 HOSPITAL OBSERVATION PER HR: HCPCS

## 2018-10-31 PROCEDURE — 25010000002 DEXAMETHASONE PER 1 MG: Performed by: INTERNAL MEDICINE

## 2018-10-31 PROCEDURE — 99245 OFF/OP CONSLTJ NEW/EST HI 55: CPT | Performed by: PSYCHIATRY & NEUROLOGY

## 2018-10-31 PROCEDURE — 95819 EEG AWAKE AND ASLEEP: CPT

## 2018-10-31 PROCEDURE — 99243 OFF/OP CNSLTJ NEW/EST LOW 30: CPT | Performed by: PHYSICIAN ASSISTANT

## 2018-10-31 PROCEDURE — 96372 THER/PROPH/DIAG INJ SC/IM: CPT

## 2018-10-31 PROCEDURE — 0 GADOBENATE DIMEGLUMINE 529 MG/ML SOLUTION: Performed by: FAMILY MEDICINE

## 2018-10-31 RX ORDER — KETOROLAC TROMETHAMINE 30 MG/ML
15 INJECTION, SOLUTION INTRAMUSCULAR; INTRAVENOUS ONCE
Status: COMPLETED | OUTPATIENT
Start: 2018-10-31 | End: 2018-10-31

## 2018-10-31 RX ORDER — METOCLOPRAMIDE HYDROCHLORIDE 5 MG/ML
10 INJECTION INTRAMUSCULAR; INTRAVENOUS ONCE
Status: COMPLETED | OUTPATIENT
Start: 2018-10-31 | End: 2018-10-31

## 2018-10-31 RX ORDER — DEXAMETHASONE SODIUM PHOSPHATE 10 MG/ML
10 INJECTION INTRAMUSCULAR; INTRAVENOUS ONCE
Status: COMPLETED | OUTPATIENT
Start: 2018-10-31 | End: 2018-10-31

## 2018-10-31 RX ADMIN — ASPIRIN 81 MG: 81 TABLET, COATED ORAL at 08:34

## 2018-10-31 RX ADMIN — LEVOTHYROXINE SODIUM 200 MCG: 200 TABLET ORAL at 06:06

## 2018-10-31 RX ADMIN — DEXAMETHASONE SODIUM PHOSPHATE 10 MG: 10 INJECTION INTRAMUSCULAR; INTRAVENOUS at 15:54

## 2018-10-31 RX ADMIN — ATORVASTATIN CALCIUM 40 MG: 40 TABLET, FILM COATED ORAL at 00:00

## 2018-10-31 RX ADMIN — VERAPAMIL HYDROCHLORIDE 120 MG: 120 TABLET, FILM COATED, EXTENDED RELEASE ORAL at 14:17

## 2018-10-31 RX ADMIN — PANTOPRAZOLE SODIUM 40 MG: 40 TABLET, DELAYED RELEASE ORAL at 06:06

## 2018-10-31 RX ADMIN — GADOBENATE DIMEGLUMINE 20 ML: 529 INJECTION, SOLUTION INTRAVENOUS at 00:00

## 2018-10-31 RX ADMIN — MORPHINE SULFATE 1 MG: 2 INJECTION, SOLUTION INTRAMUSCULAR; INTRAVENOUS at 21:23

## 2018-10-31 RX ADMIN — MORPHINE SULFATE 1 MG: 2 INJECTION, SOLUTION INTRAMUSCULAR; INTRAVENOUS at 11:38

## 2018-10-31 RX ADMIN — HEPARIN SODIUM 5000 UNITS: 5000 INJECTION, SOLUTION INTRAVENOUS; SUBCUTANEOUS at 21:23

## 2018-10-31 RX ADMIN — KETOROLAC TROMETHAMINE 15 MG: 30 INJECTION, SOLUTION INTRAMUSCULAR at 15:54

## 2018-10-31 RX ADMIN — HEPARIN SODIUM 5000 UNITS: 5000 INJECTION, SOLUTION INTRAVENOUS; SUBCUTANEOUS at 14:17

## 2018-10-31 RX ADMIN — SODIUM CHLORIDE 100 ML/HR: 9 INJECTION, SOLUTION INTRAVENOUS at 08:34

## 2018-10-31 RX ADMIN — DULOXETINE HYDROCHLORIDE 30 MG: 30 CAPSULE, DELAYED RELEASE ORAL at 08:34

## 2018-10-31 RX ADMIN — ATENOLOL 50 MG: 50 TABLET ORAL at 08:34

## 2018-10-31 RX ADMIN — METOCLOPRAMIDE 10 MG: 5 INJECTION, SOLUTION INTRAMUSCULAR; INTRAVENOUS at 15:54

## 2018-10-31 RX ADMIN — HEPARIN SODIUM 5000 UNITS: 5000 INJECTION, SOLUTION INTRAVENOUS; SUBCUTANEOUS at 06:06

## 2018-11-01 VITALS
DIASTOLIC BLOOD PRESSURE: 66 MMHG | RESPIRATION RATE: 16 BRPM | TEMPERATURE: 98.3 F | SYSTOLIC BLOOD PRESSURE: 138 MMHG | HEART RATE: 86 BPM | OXYGEN SATURATION: 95 %

## 2018-11-01 PROBLEM — R51.9 HEADACHE: Status: RESOLVED | Noted: 2018-10-30 | Resolved: 2018-11-01

## 2018-11-01 PROBLEM — G43.109 COMPLICATED MIGRAINE: Status: RESOLVED | Noted: 2018-10-31 | Resolved: 2018-11-01

## 2018-11-01 PROBLEM — D16.9 OSTEOMA: Status: ACTIVE | Noted: 2018-11-01

## 2018-11-01 PROCEDURE — 99217 PR OBSERVATION CARE DISCHARGE MANAGEMENT: CPT | Performed by: NURSE PRACTITIONER

## 2018-11-01 PROCEDURE — G0378 HOSPITAL OBSERVATION PER HR: HCPCS

## 2018-11-01 PROCEDURE — 96372 THER/PROPH/DIAG INJ SC/IM: CPT

## 2018-11-01 PROCEDURE — 25010000002 HEPARIN (PORCINE) PER 1000 UNITS: Performed by: HOSPITALIST

## 2018-11-01 PROCEDURE — 99212 OFFICE O/P EST SF 10 MIN: CPT | Performed by: PSYCHIATRY & NEUROLOGY

## 2018-11-01 RX ORDER — VERAPAMIL HYDROCHLORIDE 240 MG/1
240 TABLET, FILM COATED, EXTENDED RELEASE ORAL
Status: DISCONTINUED | OUTPATIENT
Start: 2018-11-02 | End: 2018-11-01 | Stop reason: HOSPADM

## 2018-11-01 RX ORDER — ASPIRIN 81 MG/1
81 TABLET ORAL DAILY
Start: 2018-11-02 | End: 2021-11-17

## 2018-11-01 RX ORDER — VERAPAMIL HYDROCHLORIDE 240 MG/1
240 TABLET, FILM COATED, EXTENDED RELEASE ORAL
Qty: 30 TABLET | Refills: 0 | Status: SHIPPED | OUTPATIENT
Start: 2018-11-02 | End: 2019-02-20 | Stop reason: SDUPTHER

## 2018-11-01 RX ADMIN — VERAPAMIL HYDROCHLORIDE 120 MG: 120 TABLET, FILM COATED, EXTENDED RELEASE ORAL at 09:10

## 2018-11-01 RX ADMIN — ASPIRIN 81 MG: 81 TABLET, COATED ORAL at 09:10

## 2018-11-01 RX ADMIN — HEPARIN SODIUM 5000 UNITS: 5000 INJECTION, SOLUTION INTRAVENOUS; SUBCUTANEOUS at 05:29

## 2018-11-01 RX ADMIN — Medication 3 ML: at 09:11

## 2018-11-01 RX ADMIN — LEVOTHYROXINE SODIUM 200 MCG: 200 TABLET ORAL at 05:29

## 2018-11-01 NOTE — PROGRESS NOTES
Neurology       Patient Care Team:  Wily Cobb MD as PCP - General (Family Medicine)  Wily Cobb MD as Consulting Physician (Family Medicine)  Shaan Jhaveri MD as Consulting Physician (Neurology)    Chief complaint: Complex migraine    History:  Breakthrough headache yesterday with good response to Decadron 10, Toradol 30, Reglan 10.    Blood pressures up to 138 systolic this morning.  Tolerating verapamil  mg without difficulty or graft Dr. Calderon has evaluated the patient and agrees with the diagnosis of osteoma with the complex migraine.  Past Medical History:   Diagnosis Date   • Anxiety    • Brain tumor (CMS/HCC)    • Brain tumor (CMS/HCC)    • C. difficile colitis 2015   • Disease of thyroid gland     s/p thyroidectomy    • Dyspnea    • GERD (gastroesophageal reflux disease)    • Hemorrhoids    • Hypertension    • Kidney infection     treated recently-completed all antibiotics    • Loss, vision, sudden     episodes of vision loss-'black out vision'-reason unknown    • Near syncope     reason unknown    • Nerve pain    • Rash     this summer-rash of unknown origin-has used a cream prn    • Skin rash     BLE/BUE with scattered red bumps, pt states present since Spring 2016   • Swelling of ankle    • Vitamin D deficiency        Vital Signs   Vitals:    11/01/18 0500 11/01/18 0719 11/01/18 0912 11/01/18 1111   BP:  141/76  138/66   BP Location:  Left arm  Left arm   Patient Position:  Lying  Lying   Pulse: 71 83 91 86   Resp: 18 16  16   Temp: 97.9 °F (36.6 °C) 98.2 °F (36.8 °C)  98.3 °F (36.8 °C)   TempSrc: Oral Oral  Oral   SpO2:  94% 95%        Physical Exam:   General: Pleasant middle              Neuro: Oriented to person place and time.    Speech is normal.    All extremities move well.        Results Review:  No new results    Results from last 7 days  Lab Units 10/30/18  1049   WBC 10*3/mm3 9.16   HEMOGLOBIN g/dL 13.1   HEMATOCRIT % 42.1   PLATELETS 10*3/mm3 205       Results from last 7  days  Lab Units 10/31/18  0506 10/30/18  1049   SODIUM mmol/L 140 142   POTASSIUM mmol/L 4.1 3.6   CHLORIDE mmol/L 102 105   CO2 mmol/L 28.0 28.6   BUN mg/dL 15 14   CREATININE mg/dL 0.82 0.76   CALCIUM mg/dL 8.2* 9.0   BILIRUBIN mg/dL  --  0.5   ALK PHOS U/L  --  101   ALT (SGPT) U/L  --  40*   AST (SGOT) U/L  --  31*   GLUCOSE mg/dL 93 127*       Imaging Results (last 24 hours)     ** No results found for the last 24 hours. **          Assessment:  Complex migraine    Plan:  Verapamil 240 mg daily.    She can increase the one to the supervision of Dr. Matthews he will see her in a month.        Comment:  The patient does not have epilepsy nor does she have a brain tumor.    She can return to work without restrictions there are no driving limitations.             I discussed the patients findings and my recommendations with patient, primary care team and consulting provider    Natan Cassidy MD  11/01/18  11:43 AM

## 2018-11-01 NOTE — DISCHARGE SUMMARY
[facility] Medicine Services  DISCHARGE SUMMARY    Patient Name: Trevon Jasso  : 1960  MRN: [id number]    Date of Admission: 10/30/2018  Date of Discharge:  2018  Primary Care Physician: Wily Cobb MD    Consults     Date and Time Order Name Status Description    10/31/2018 1231 Inpatient Neurosurgery Consult Completed     10/31/2018 0030 Inpatient Neurology Consult General Completed         Hospital Course     Presenting Problem:   Seizure (CMS/[facility]) [R56.9]    Active Hospital Problems    Diagnosis Date Noted   • Osteoma [D16.9] 2018   • Obesity, Class III, BMI 40-49.9 (morbid obesity) (CMS/[facility]) [E66.01] 10/31/2018   • Hypothyroidism [E03.9] 10/30/2018   • Meralgia paresthetica [G57.10] 2016      Resolved Hospital Problems    Diagnosis Date Noted Date Resolved   • **Complicated migraine [G43.109] 10/31/2018 2018   • Headache [R51] 10/30/2018 2018      Hospital Course:  Trevon Jasos is a 58 y.o. female with history of hypothyroidism, questionable seizures, hypertension who presented with a 10 out of 10 headache associated with visual disturbances and dizziness and near syncope.  Transferred to [facility] for further neurological evaluation. EEG negative for seizures. She does not need any further keppra or topamax. Dr. Cassidy feels her symptoms are consistent with complicated migraine. She has been started on Verapamil. Headache and visual symptoms have resolved. She should f/u with Dr. Overton in 2-4 weeks. Referral has been placed. MRI revealed an osteoma. Neurosurgery was consulted. No surgical intervention needed. She can follow with neurology and does not need neurosurgery follow up.     Discharge Follow Up Recommendations for labs/diagnostics:  PCP in a few days  Dr. Overton in 2-4 weeks    Day of Discharge     HPI:   Headache much better, 1/10, wants to go home    Review of Systems  Gen- No fevers, chills  CV- No chest pain,  palpitations  Resp- No cough, dyspnea  GI- No N/V/D, abd pain    Otherwise ROS is negative except as mentioned in the HPI.    Vital Signs:   Temp:  [97.8 °F (36.6 °C)-99.6 °F (37.6 °C)] 98.3 °F (36.8 °C)  Heart Rate:  [71-93] 86  Resp:  [16-18] 16  BP: (104-141)/(64-76) 138/66     Physical Exam:  Gen-no acute distress, up ambulating in room  CV-RRR, S1 S2 normal, no m/r/g  Resp-CTAB, normal WOB  Abd-soft, NT, ND, +BS  Ext-no edema, PPP  Neuro-A&Ox3, no focal deficits  Skin-no overt rashes noted  Psych-appropriate mood    Pertinent  and/or Most Recent Results       Results from last 7 days  Lab Units 10/31/18  0506 10/30/18  1049   WBC 10*3/mm3  --  9.16   HEMOGLOBIN g/dL  --  13.1   HEMATOCRIT %  --  42.1   PLATELETS 10*3/mm3  --  205   SODIUM mmol/L 140 142   POTASSIUM mmol/L 4.1 3.6   CHLORIDE mmol/L 102 105   CO2 mmol/L 28.0 28.6   BUN mg/dL 15 14   CREATININE mg/dL 0.82 0.76   GLUCOSE mg/dL 93 127*   CALCIUM mg/dL 8.2* 9.0       Results from last 7 days  Lab Units 10/30/18  1049   BILIRUBIN mg/dL 0.5   ALK PHOS U/L 101   ALT (SGPT) U/L 40*   AST (SGOT) U/L 31*   PROTIME Seconds 12.7   INR  0.93   APTT seconds 29.2       Results from last 7 days  Lab Units 10/31/18  0506   CHOLESTEROL mg/dL 148   TRIGLYCERIDES mg/dL 144   HDL CHOL mg/dL 31*       Results from last 7 days  Lab Units 10/31/18  0506 10/30/18  1049   TSH mIU/mL 4.004  --    TROPONIN I ng/mL  --  <0.006     Brief Urine Lab Results  (Last result in the past 365 days)      Color   Clarity   Blood   Leuk Est   Nitrite   Protein   CREAT   Urine HCG        10/30/18 1328 Yellow Clear Negative Trace(A) Negative Negative             Imaging Results (all)     Procedure Component Value Units Date/Time    MRI Brain With & Without Contrast [[id number]] Collected:  10/31/18 0901     Updated:  10/31/18 1103    Narrative:       EXAMINATION: MRI BRAIN W WO CONTRAST- 10/30/2018     INDICATION: HA/seizure/visual disturbances     TECHNIQUE:  Multiplanar multisequence MRI  of the brain was performed  without and with contrast.     COMPARISONS:  None.     FINDINGS:       Brain volume is within normal limits.  Ventricles are normal in size and  configuration. Mesial temporal lobe structures are reasonably  symmetrical in size and signal. There is no evidence of cortical  dysplasia or gray matter heterotopia.  There is no significant abnormal  signal on FLAIR or diffusion weighted images. There is no abnormal  parenchymal or extra-axial enhancement. There is no mass effect and the  basal cisterns are patent. [place] flow voids are maintained.   No significant abnormal sinonasal or temporal bone fluid is identified.       Impression:       Normal brain MRI.      D:  10/31/2018  E:  10/31/2018     This report was finalized on 10/31/2018 11:00 AM by Jason Kaye.                     Discharge Details        Discharge Medications      New Medications      Instructions Start Date   aspirin 81 MG EC tablet   81 mg, Oral, Daily      verapamil  MG CR tablet  Commonly known as:  CALAN-SR   240 mg, Oral, Every 24 Hours Scheduled         Continue These Medications      Instructions Start Date   Apple Cider Vinegar 600 MG capsule   600 mg, Oral, Daily      clobetasol 0.05 % cream  Commonly known as:  TEMOVATE   Topical, 2 Times Daily      diphenhydrAMINE-acetaminophen  MG tablet per tablet  Commonly known as:  TYLENOL PM   1 tablet, Oral, Nightly PRN      HYDROcodone-acetaminophen 7.5-325 MG per tablet  Commonly known as:  NORCO   1 tablet, Oral, Nightly PRN      levothyroxine 200 MCG tablet  Commonly known as:  SYNTHROID, LEVOTHROID   200 mcg, Oral, Daily      pantoprazole 40 MG EC tablet  Commonly known as:  PROTONIX   40 mg, Oral, Daily      traZODone 100 MG tablet  Commonly known as:  DESYREL   100 mg, Oral, Nightly      vitamin D 00879 units capsule capsule  Commonly known as:  ERGOCALCIFEROL   50,000 Units, Oral, Every 7 Days, Takes on wednesdays         Stop These  Medications    amoxicillin-clavulanate 875-125 MG per tablet  Commonly known as:  AUGMENTIN     atenolol 50 MG tablet  Commonly known as:  TENORMIN     bumetanide 1 MG tablet  Commonly known as:  BUMEX     CloNIDine 0.1 MG tablet  Commonly known as:  CATAPRES     DUEXIS 800-26.6 MG tablet  Generic drug:  Ibuprofen-Famotidine     furosemide 20 MG tablet  Commonly known as:  LASIX     guaifenesin-codeine 100-10 MG/5ML liquid  Commonly known as:  GUAIFENESIN AC     KLOR-CON 10 MEQ CR tablet  Generic drug:  potassium chloride     levETIRAcetam 500 MG tablet  Commonly known as:  KEPPRA     topiramate 25 MG capsule  Commonly known as:  TOPAMAX          Discharge Disposition:  Home or Self Care    Discharge Diet: as tolerated    Discharge Activity: as tolerated    Code Status/Level of Support:  Code Status and Medical Interventions:   Ordered at: 10/30/18 1918     Code Status:    CPR     Medical Interventions (Level of Support Prior to Arrest):    Full     Additional Instructions for the Follow-ups that You Need to Schedule     Discharge Follow-up with PCP    As directed      Currently Documented PCP:  Wily Cobb MD  PCP Phone Number:  [phone]    Follow Up Details:  PCP in a few days, Re: Hospital f/u complicated migraine, BP check             Time Spent on Discharge: 40 minutes    Electronically signed by BLADIMIR Morton, 11/01/18, 12:00 PM.

## 2018-12-05 ENCOUNTER — TRANSCRIBE ORDERS (OUTPATIENT)
Dept: ADMINISTRATIVE | Facility: HOSPITAL | Age: 58
End: 2018-12-05

## 2018-12-05 DIAGNOSIS — M25.512 LEFT SHOULDER PAIN, UNSPECIFIED CHRONICITY: Primary | ICD-10-CM

## 2018-12-13 ENCOUNTER — HOSPITAL ENCOUNTER (OUTPATIENT)
Dept: MRI IMAGING | Facility: HOSPITAL | Age: 58
Discharge: HOME | End: 2018-12-13

## 2018-12-13 DIAGNOSIS — M25.512 LEFT SHOULDER PAIN, UNSPECIFIED CHRONICITY: ICD-10-CM

## 2018-12-14 ENCOUNTER — TRANSCRIBE ORDERS (OUTPATIENT)
Dept: ADMINISTRATIVE | Facility: HOSPITAL | Age: 58
End: 2018-12-14

## 2018-12-14 DIAGNOSIS — M25.512 LEFT SHOULDER PAIN, UNSPECIFIED CHRONICITY: Primary | ICD-10-CM

## 2018-12-20 ENCOUNTER — TRANSCRIBE ORDERS (OUTPATIENT)
Dept: LAB | Facility: HOSPITAL | Age: 58
End: 2018-12-20

## 2018-12-20 DIAGNOSIS — M25.512 LEFT SHOULDER PAIN, UNSPECIFIED CHRONICITY: Primary | ICD-10-CM

## 2018-12-26 ENCOUNTER — HOSPITAL ENCOUNTER (OUTPATIENT)
Dept: MRI IMAGING | Facility: HOSPITAL | Age: 58
Discharge: HOME | End: 2018-12-26

## 2018-12-26 ENCOUNTER — APPOINTMENT (OUTPATIENT)
Dept: MRI IMAGING | Facility: HOSPITAL | Age: 58
End: 2018-12-26

## 2018-12-26 DIAGNOSIS — M25.512 LEFT SHOULDER PAIN, UNSPECIFIED CHRONICITY: ICD-10-CM

## 2019-02-20 ENCOUNTER — OFFICE VISIT (OUTPATIENT)
Dept: NEUROLOGY | Facility: CLINIC | Age: 59
End: 2019-02-20

## 2019-02-20 VITALS
DIASTOLIC BLOOD PRESSURE: 76 MMHG | BODY MASS INDEX: 41.12 KG/M2 | HEIGHT: 67 IN | SYSTOLIC BLOOD PRESSURE: 132 MMHG | WEIGHT: 262 LBS

## 2019-02-20 DIAGNOSIS — G43.109 COMPLICATED MIGRAINE: Primary | ICD-10-CM

## 2019-02-20 PROCEDURE — 99204 OFFICE O/P NEW MOD 45 MIN: CPT | Performed by: PSYCHIATRY & NEUROLOGY

## 2019-02-20 RX ORDER — VERAPAMIL HYDROCHLORIDE 240 MG/1
240 TABLET, FILM COATED, EXTENDED RELEASE ORAL
Qty: 30 TABLET | Refills: 3 | Status: SHIPPED | OUTPATIENT
Start: 2019-02-20 | End: 2019-05-21 | Stop reason: SDUPTHER

## 2019-02-20 RX ORDER — RIZATRIPTAN BENZOATE 10 MG/1
10 TABLET ORAL ONCE AS NEEDED
Qty: 9 TABLET | Refills: 3 | Status: SHIPPED | OUTPATIENT
Start: 2019-02-20 | End: 2019-03-22

## 2019-02-20 NOTE — PROGRESS NOTES
Subjective:    CC: Trevon Jasso is seen today in consultation at the request of BLADIMIR Morton for Migraine       HPI:  Patient is a 58-year-old female referred to the clinic for evaluation and management of migraines.  She was admitted to [facility] in November 2000 the 18th with headache, associated with visual changes, transient loss of vision, difficulty with balance and speech.  Initially she went to [place] and underwent CT head without contrast and then was transferred to [facility] for further evaluation and treatment.  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  4 osteoma, she saw neurosurgery and no surgical treatment was recommended.  While in the hospital, Decadron, Reglan was given intravenously to break the headache and it did help.  He was then started on verapamil 240 mg extended release and that has helped the reduce both intensity and frequency of headaches.  Prior to starting verapamil, she was getting the approximately 10-12 headaches in a month and now she is getting about 4 headaches in a month.  Typically the headache starts at the top of the head or front of the head associated with the light and sound sensitivity.  She describes pain quality is 8 out of 10 with the quality being pressing/squeezing type of pain.  Typical trigger for her headache is bright light.    The following portions of the patient's history were reviewed today and updated as of 02/20/2019  : allergies, social history and problem list.  This document will be scanned to patient's chart.      Current Outpatient Medications:   •  Apple Cider Vinegar 600 MG capsule, Take 600 mg by mouth daily., Disp: , Rfl:   •  aspirin 81 MG EC tablet, Take 1 tablet by mouth Daily., Disp: , Rfl:   •  clobetasol (TEMOVATE) 0.05 % cream, Apply  topically 2 (Two) Times a Day., Disp: , Rfl:   •  diphenhydrAMINE-acetaminophen (TYLENOL PM)  MG tablet per tablet,  Take 1 tablet by mouth at night as needed for sleep., Disp: , Rfl:   •  HYDROcodone-acetaminophen (NORCO) 7.5-325 MG per tablet, Take 1 tablet by mouth at night as needed for moderate pain (4-6)., Disp: , Rfl:   •  levothyroxine (SYNTHROID, LEVOTHROID) 200 MCG tablet, Take 200 mcg by mouth daily., Disp: , Rfl:   •  pantoprazole (PROTONIX) 40 MG EC tablet, Take 40 mg by mouth daily., Disp: , Rfl:   •  traZODone (DESYREL) 100 MG tablet, Take 100 mg by mouth Every Night., Disp: , Rfl:   •  verapamil SR (CALAN-SR) 240 MG CR tablet, Take 1 tablet by mouth Daily., Disp: 30 tablet, Rfl: 3  •  vitamin D (ERGOCALCIFEROL) 30987 UNITS capsule capsule, Take 50,000 Units by mouth every 7 days. Takes on  , Disp: , Rfl:   •  rizatriptan (MAXALT) 10 MG tablet, Take 1 tablet by mouth 1 (One) Time As Needed for Migraine for up to 30 days. May repeat in 2 hours if needed, Disp: 9 tablet, Rfl: 3   Past Medical History:   Diagnosis Date   • Anxiety    • Brain tumor (CMS/HCC)    • Brain tumor (CMS/HCC)    • C. difficile colitis    • Disease of thyroid gland     s/p thyroidectomy    • Dyspnea    • GERD (gastroesophageal reflux disease)    • Hemorrhoids    • Hypertension    • Kidney infection     treated recently-completed all antibiotics    • Loss, vision, sudden     episodes of vision loss-'black out vision'-reason unknown    • Migraine    • Near syncope     reason unknown    • Nerve pain    • Rash     this summer-rash of unknown origin-has used a cream prn    • Skin rash     BLE/BUE with scattered red bumps, pt states present since Spring 2016   • Swelling of ankle    • Vitamin D deficiency       Past Surgical History:   Procedure Laterality Date   •  SECTION     • COLONOSCOPY W/ POLYPECTOMY     • HYSTERECTOMY     • OTHER SURGICAL HISTORY      right lateral femoral cutaneous nerve transection at the inguinal ligament    • PERONEAL NERVE DECOMPRESSION Right 2016    Procedure: RIGHT LATERAL FEMORAL CUTANEOUS  "NERVE TRANSECTION AT LIGUINAL LIGAMENT;  Surgeon: Ethan Tate MD;  [facility];  Service:    • THYROID SURGERY      removed because 'large'   • TONSILLECTOMY        History reviewed. No pertinent family history.   Review of Systems   Neurological: Positive for headache.       All other systems reviewed and are negative     Objective:    /76   Ht 170.2 cm (67\")   Wt 119 kg (262 lb)   LMP  (LMP Unknown)   BMI 41.04 kg/m²     Neurology Exam:    General apperance: NAD.     Mental status: Alert, awake and oriented to time place and person.    Recent and Remote memory: Can recall 3/3 objects at 5 minutes. Can recall historical events.     Attention span and Concentration: Serial 7s: Normal.     Fund of knowledge:  Normal.     Language and Speech: No aphasia or dysarthria.    Naming , Repitition and Comprehension:  Can name objects, repeat a sentence and follow commands. Speech is clear and fluent with good repetition, comprehension, and naming.    Cranial Nerves:   CN II: Visual fields are full. Intact. Fundi - Normal, No papillederma, Pupils - KEON  CN III, IV and VI: Extraocular movements are intact. Normal saccades.   CN V: Facial sensation is intact.   CN VII: Muscles of facial expression reveal no asymmetry. Intact.   CN VIII: Hearing is intact. Whispered voice intact.   CN IX and X: Palate elevates symmetrically. Intact  CN XI: Shoulder shrug is intact.   CN XII: Tongue is midline without evidence of atrophy or fasciculation.     Motor:  Right UE muscle strength 5/5. Normal tone.     Left UE muscle strength 5/5. Normal tone.      Right LE muscle strength5/5. Normal tone.     Left LE muscle strength 5/5. Normal tone.      Sensory: Normal light touch, vibration and pinprick sensation bilaterally.    DTRs: 2+ bilaterally in upper and lower extremities.    Babinski: Negative bilaterally.    Co-ordination: Normal finger-to-nose, heel to shin B/L.    Rhomberg: Negative.    Gait: " Normal.    Cardiovascular: Regular rate and rhythm without murmur, gallop or rub.    Assessment and Plan:  1. Complicated migraine  Doing much better with verapamil 240 mg daily.  It has reduced both headache intensity and frequency.  Current headache frequency is approximately 4 headaches in a month.  She has not been prescribed abortive treatment.  Will start her on Maxalt 10 mg as needed for bad headaches as an abortive therapy.  Continue with Verapamil.  I'll see her back in 3 months for follow-up.       Return in about 3 months (around 5/20/2019).     Bulmaro Overton MD

## 2019-05-21 RX ORDER — VERAPAMIL HYDROCHLORIDE 240 MG/1
240 TABLET, FILM COATED, EXTENDED RELEASE ORAL
Qty: 30 TABLET | Refills: 3 | Status: SHIPPED | OUTPATIENT
Start: 2019-05-21

## 2019-07-25 ENCOUNTER — OFFICE VISIT (OUTPATIENT)
Dept: NEUROLOGY | Facility: CLINIC | Age: 59
End: 2019-07-25

## 2019-07-25 VITALS
WEIGHT: 262 LBS | HEIGHT: 67 IN | DIASTOLIC BLOOD PRESSURE: 64 MMHG | BODY MASS INDEX: 41.12 KG/M2 | SYSTOLIC BLOOD PRESSURE: 132 MMHG

## 2019-07-25 DIAGNOSIS — G43.109 COMPLICATED MIGRAINE: Primary | ICD-10-CM

## 2019-07-25 PROCEDURE — 99213 OFFICE O/P EST LOW 20 MIN: CPT | Performed by: PSYCHIATRY & NEUROLOGY

## 2019-07-25 NOTE — PROGRESS NOTES
Subjective:    CC: Trevon Jasso is in clinic today for follow up for complicated migraines.      HPI:  She is in clinic for regular follow-up.  Since her last visit, she reports that verapamil extended release 240 mg daily continues to help keeping both headache intensity and frequency under control.  In the last 5 months, she had to take Maxalt only 3 times.  She is tolerating both Maxalt and verapamil well without any side effects.  In addition to this, she is reporting problems with sleep and also forgetting things on occasions.    The following portions of the patient's history were reviewed and updated as of 07/25/2019: allergies, social history and problem list.       Current Outpatient Medications:   •  Apple Cider Vinegar 600 MG capsule, Take 600 mg by mouth daily., Disp: , Rfl:   •  aspirin 81 MG EC tablet, Take 1 tablet by mouth Daily., Disp: , Rfl:   •  clobetasol (TEMOVATE) 0.05 % cream, Apply  topically 2 (Two) Times a Day., Disp: , Rfl:   •  diphenhydrAMINE-acetaminophen (TYLENOL PM)  MG tablet per tablet, Take 1 tablet by mouth at night as needed for sleep., Disp: , Rfl:   •  HYDROcodone-acetaminophen (NORCO) 7.5-325 MG per tablet, Take 1 tablet by mouth at night as needed for moderate pain (4-6)., Disp: , Rfl:   •  levothyroxine (SYNTHROID, LEVOTHROID) 200 MCG tablet, Take 200 mcg by mouth daily., Disp: , Rfl:   •  pantoprazole (PROTONIX) 40 MG EC tablet, Take 40 mg by mouth daily., Disp: , Rfl:   •  traZODone (DESYREL) 100 MG tablet, Take 100 mg by mouth Every Night., Disp: , Rfl:   •  verapamil SR (CALAN-SR) 240 MG CR tablet, Take 1 tablet by mouth Daily., Disp: 30 tablet, Rfl: 3  •  vitamin D (ERGOCALCIFEROL) 00181 UNITS capsule capsule, Take 50,000 Units by mouth every 7 days. Takes on wednesdays , Disp: , Rfl:    Past Medical History:   Diagnosis Date   • Anxiety    • Brain tumor (CMS/HCC)    • Brain tumor (CMS/HCC)    • C. difficile colitis 2015   • Disease of thyroid gland     s/p  "thyroidectomy    • Dyspnea    • GERD (gastroesophageal reflux disease)    • Hemorrhoids    • Hypertension    • Kidney infection     treated recently-completed all antibiotics    • Loss, vision, sudden     episodes of vision loss-'black out vision'-reason unknown    • Migraine    • Near syncope     reason unknown    • Nerve pain    • Rash     this summer-rash of unknown origin-has used a cream prn    • Skin rash     BLE/BUE with scattered red bumps, pt states present since Spring 2016   • Swelling of ankle    • Vitamin D deficiency       Past Surgical History:   Procedure Laterality Date   •  SECTION     • COLONOSCOPY W/ POLYPECTOMY     • HYSTERECTOMY     • OTHER SURGICAL HISTORY      right lateral femoral cutaneous nerve transection at the inguinal ligament    • PERONEAL NERVE DECOMPRESSION Right 2016    Procedure: RIGHT LATERAL FEMORAL CUTANEOUS NERVE TRANSECTION AT LIGUINAL LIGAMENT;  Surgeon: Ethan Tate MD;  Location: [facility];  Service:    • THYROID SURGERY      removed because 'large'   • TONSILLECTOMY        History reviewed. No pertinent family history.     Review of Systems   Cardiovascular: Positive for leg swelling.   Gastrointestinal: Positive for abdominal pain.   Musculoskeletal: Positive for back pain, gait problem, joint swelling and myalgias.   Neurological: Positive for dizziness, speech difficulty, light-headedness, headache and confusion.     Objective:    /64   Ht 170.2 cm (67\")   Wt 119 kg (262 lb)   LMP  (LMP Unknown)   BMI 41.04 kg/m²     Neurology Exam:  General apperance: NAD.     Mental status: Alert, awake and oriented to time place and person.    Recent and Remote memory: Can recall 3/3 objects at 5 minutes. Can recall historical events.     Attention span and Concentration: Serial 7s: Normal.     Fund of knowledge:  Normal.     Language and Speech: No aphasia or dysarthria.    Naming , Repitition and Comprehension:  Can name objects, repeat a sentence and " follow commands. Speech is clear and fluent with good repetition, comprehension, and naming.    CN II to XII: Intact.    Opthalmoscopic Exam: No papilledema.    Motor:  Right UE muscle strength 5/5. Normal tone.     Left UE muscle strength 5/5. Normal tone.      Right LE muscle strength5/5. Normal tone.     Left LE muscle strength 5/5. Normal tone.      Sensory: Normal light touch, vibration and pinprick sensation bilaterally.    DTRs: 2+ bilaterally.    Babinski: Negative bilaterally.    Co-ordination: Normal finger-to-nose, heel to shin B/L.    Rhomberg: Negative.    Gait: Normal.    Cardiovascular: Regular rate and rhythm without murmur, gallop or rub.    Assessment and Plan:  1. Complicated migraine  Doing very well with verapamil extended release 240 mg daily.  Both headache frequency and intensity is good control.  She is she is reporting problems with sleep, I have advised her to take melatonin 10 to 50 mg 1 to 2 hours before bedtime.  I also advised her to have vitamin B12 checked when she sees her primary care physician next.  I will see her back in 6 months for follow-up.       I spent 15 minutes face to face with the patient and spent 10 minutes of this time  in management, instructions and education regarding above mentioned diagnosis and also on counseling and discussing about taking medication regularly, possible side effects with medication use, importance of good sleep hygiene, good hydration and regular exercise.    Return in about 6 months (around 1/25/2020).

## 2020-07-13 ENCOUNTER — LAB (OUTPATIENT)
Dept: LAB | Facility: HOSPITAL | Age: 60
End: 2020-07-13

## 2020-07-13 ENCOUNTER — TRANSCRIBE ORDERS (OUTPATIENT)
Dept: ADMINISTRATIVE | Facility: HOSPITAL | Age: 60
End: 2020-07-13

## 2020-07-13 DIAGNOSIS — R11.0 NAUSEA: ICD-10-CM

## 2020-07-13 DIAGNOSIS — R05.9 COUGH: Primary | ICD-10-CM

## 2020-07-13 DIAGNOSIS — R05.9 COUGH: ICD-10-CM

## 2020-07-13 PROCEDURE — U0003 INFECTIOUS AGENT DETECTION BY NUCLEIC ACID (DNA OR RNA); SEVERE ACUTE RESPIRATORY SYNDROME CORONAVIRUS 2 (SARS-COV-2) (CORONAVIRUS DISEASE [COVID-19]), AMPLIFIED PROBE TECHNIQUE, MAKING USE OF HIGH THROUGHPUT TECHNOLOGIES AS DESCRIBED BY CMS-2020-01-R: HCPCS

## 2020-07-17 LAB — SARS-COV-2 RNA RESP QL NAA+PROBE: NOT DETECTED

## 2021-02-25 DIAGNOSIS — Z23 IMMUNIZATION DUE: ICD-10-CM

## 2021-03-19 ENCOUNTER — APPOINTMENT (OUTPATIENT)
Dept: MAMMOGRAPHY | Facility: HOSPITAL | Age: 61
End: 2021-03-19

## 2021-03-22 ENCOUNTER — TRANSCRIBE ORDERS (OUTPATIENT)
Dept: ADMINISTRATIVE | Facility: HOSPITAL | Age: 61
End: 2021-03-22

## 2021-03-22 DIAGNOSIS — Z01.818 PRE-OPERATIVE CLEARANCE: Primary | ICD-10-CM

## 2021-03-24 ENCOUNTER — LAB (OUTPATIENT)
Dept: LAB | Facility: HOSPITAL | Age: 61
End: 2021-03-24

## 2021-03-24 DIAGNOSIS — Z01.818 PRE-OPERATIVE CLEARANCE: ICD-10-CM

## 2021-03-24 PROCEDURE — U0004 COV-19 TEST NON-CDC HGH THRU: HCPCS

## 2021-03-24 PROCEDURE — C9803 HOPD COVID-19 SPEC COLLECT: HCPCS

## 2021-03-25 LAB — SARS-COV-2 RNA NOSE QL NAA+PROBE: NOT DETECTED

## 2021-09-02 ENCOUNTER — TRANSCRIBE ORDERS (OUTPATIENT)
Dept: ADMINISTRATIVE | Facility: HOSPITAL | Age: 61
End: 2021-09-02

## 2021-09-02 DIAGNOSIS — R10.11 ABDOMINAL PAIN, RIGHT UPPER QUADRANT: Primary | ICD-10-CM

## 2021-09-07 ENCOUNTER — HOSPITAL ENCOUNTER (OUTPATIENT)
Dept: ULTRASOUND IMAGING | Facility: HOSPITAL | Age: 61
Discharge: HOME | End: 2021-09-07
Admitting: NURSE PRACTITIONER

## 2021-09-07 DIAGNOSIS — R10.11 ABDOMINAL PAIN, RIGHT UPPER QUADRANT: ICD-10-CM

## 2021-09-07 PROCEDURE — 76705 ECHO EXAM OF ABDOMEN: CPT | Performed by: RADIOLOGY

## 2021-09-07 PROCEDURE — 76705 ECHO EXAM OF ABDOMEN: CPT

## 2021-11-16 ENCOUNTER — OFFICE VISIT (OUTPATIENT)
Dept: SURGERY | Facility: CLINIC | Age: 61
End: 2021-11-16

## 2021-11-16 VITALS
SYSTOLIC BLOOD PRESSURE: 142 MMHG | HEIGHT: 68 IN | BODY MASS INDEX: 39.25 KG/M2 | WEIGHT: 259 LBS | DIASTOLIC BLOOD PRESSURE: 82 MMHG

## 2021-11-16 DIAGNOSIS — K80.20 GALLSTONES: ICD-10-CM

## 2021-11-16 DIAGNOSIS — E66.01 OBESITY, CLASS III, BMI 40-49.9 (MORBID OBESITY) (HCC): Primary | ICD-10-CM

## 2021-11-16 PROCEDURE — 99203 OFFICE O/P NEW LOW 30 MIN: CPT | Performed by: SURGERY

## 2021-11-16 RX ORDER — MONTELUKAST SODIUM 10 MG/1
TABLET ORAL
COMMUNITY
Start: 2021-09-30

## 2021-11-16 NOTE — H&P (VIEW-ONLY)
Cintia Jasso is a 61 y.o. female.     Chief Complaint: gallstones    History of Present Illness She is a obese 60 yo with history of irritable bowel who has had increasing epigastric pains after eating. She also has nausea and vomiting at times, as well as diarrhea. She has had endoscopies before.     The following portions of the patient's history were reviewed and updated as appropriate: current medications, past family history, past medical history, past social history, past surgical history and problem list.    Review of Systems   Constitutional: Negative for activity change, appetite change, chills, fever and unexpected weight change.   HENT: Negative for congestion, facial swelling and sore throat.    Eyes: Negative for photophobia and visual disturbance.   Respiratory: Negative for chest tightness, shortness of breath and wheezing.    Cardiovascular: Negative for chest pain, palpitations and leg swelling.   Gastrointestinal: Positive for abdominal pain, diarrhea, nausea and vomiting. Negative for abdominal distention, anal bleeding, blood in stool, constipation and rectal pain.   Endocrine: Negative for cold intolerance, heat intolerance, polydipsia and polyuria.   Genitourinary: Negative for difficulty urinating, dysuria, flank pain and urgency.   Musculoskeletal: Negative for back pain and myalgias.   Skin: Negative for rash and wound.   Allergic/Immunologic: Negative for immunocompromised state.   Neurological: Negative for dizziness, seizures, syncope, light-headedness, numbness and headaches.   Hematological: Negative for adenopathy. Does not bruise/bleed easily.   Psychiatric/Behavioral: Negative for behavioral problems and confusion. The patient is not nervous/anxious.        Objective   Physical Exam  Vitals reviewed.   Constitutional:       General: She is not in acute distress.     Appearance: She is well-developed. She is not ill-appearing.   HENT:      Head: Normocephalic. No  laceration. Hair is normal.      Right Ear: Hearing and ear canal normal.      Left Ear: Hearing and ear canal normal.      Nose: Nose normal.      Right Sinus: No maxillary sinus tenderness or frontal sinus tenderness.      Left Sinus: No maxillary sinus tenderness or frontal sinus tenderness.   Eyes:      General: Lids are normal.      Conjunctiva/sclera: Conjunctivae normal.      Pupils: Pupils are equal, round, and reactive to light.   Neck:      Thyroid: No thyroid mass or thyromegaly.      Vascular: No JVD.      Trachea: No tracheal tenderness or tracheal deviation.   Cardiovascular:      Rate and Rhythm: Normal rate and regular rhythm.      Heart sounds: No murmur heard.  No gallop.    Pulmonary:      Effort: Pulmonary effort is normal.      Breath sounds: Normal breath sounds. No stridor. No wheezing.   Chest:      Chest wall: No tenderness.   Breasts:      Right: No supraclavicular adenopathy.      Left: No supraclavicular adenopathy.       Abdominal:      General: Bowel sounds are normal. There is no distension.      Palpations: Abdomen is soft. There is no mass.      Tenderness: There is no abdominal tenderness. There is no guarding or rebound.      Hernia: No hernia is present.   Musculoskeletal:         General: No deformity.      Cervical back: Normal range of motion.   Lymphadenopathy:      Cervical: No cervical adenopathy.      Upper Body:      Right upper body: No supraclavicular adenopathy.      Left upper body: No supraclavicular adenopathy.   Skin:     General: Skin is warm and dry.      Coloration: Skin is not pale.      Findings: No erythema or rash.   Neurological:      Mental Status: She is alert and oriented to person, place, and time.      Motor: No abnormal muscle tone.   Psychiatric:         Behavior: Behavior normal.         Thought Content: Thought content normal.         Past Medical History:   Diagnosis Date   • Anxiety    • Brain tumor (HCC)    • Brain tumor (HCC)    • C. difficile  colitis    • Disease of thyroid gland     s/p thyroidectomy    • Dyspnea    • GERD (gastroesophageal reflux disease)    • Hemorrhoids    • Hypertension    • Kidney infection     treated recently-completed all antibiotics    • Loss, vision, sudden     episodes of vision loss-'black out vision'-reason unknown    • Migraine    • Near syncope     reason unknown    • Nerve pain    • Rash     this summer-rash of unknown origin-has used a cream prn    • Skin rash     BLE/BUE with scattered red bumps, pt states present since Spring 2016   • Swelling of ankle    • Vitamin D deficiency        History reviewed. No pertinent family history.    Social History     Tobacco Use   • Smoking status: Never Smoker   • Smokeless tobacco: Never Used   Vaping Use   • Vaping Use: Never used   Substance Use Topics   • Alcohol use: No   • Drug use: No       Past Surgical History:   Procedure Laterality Date   •  SECTION     • COLONOSCOPY W/ POLYPECTOMY     • HYSTERECTOMY     • OTHER SURGICAL HISTORY      right lateral femoral cutaneous nerve transection at the inguinal ligament    • PERONEAL NERVE DECOMPRESSION Right 2016    Procedure: RIGHT LATERAL FEMORAL CUTANEOUS NERVE TRANSECTION AT LIGUINAL LIGAMENT;  Surgeon: Ethan Tate MD;  Location: [facility];  Service:    • THYROID SURGERY      removed because 'large'   • TONSILLECTOMY         Current Outpatient Medications   Medication Instructions   • Apple Cider Vinegar 600 mg, Oral, Daily   • aspirin 81 mg, Oral, Daily   • clobetasol (TEMOVATE) 0.05 % cream Topical, 2 Times Daily   • diphenhydrAMINE-acetaminophen (TYLENOL PM)  MG tablet per tablet 1 tablet, Oral, Nightly PRN   • HYDROcodone-acetaminophen (NORCO) 7.5-325 MG per tablet 1 tablet, Oral, Nightly PRN   • levothyroxine (SYNTHROID, LEVOTHROID) 200 mcg, Oral, Daily   • montelukast (SINGULAIR) 10 MG tablet No dose, route, or frequency recorded.   • pantoprazole (PROTONIX) 40 mg, Oral, Daily   • traZODone  (DESYREL) 100 mg, Oral, Nightly   • verapamil SR (CALAN-SR) 240 mg, Oral, Every 24 Hours Scheduled   • vitamin D (ERGOCALCIFEROL) 50,000 Units, Oral, Every 7 Days, Takes on wednesdays         Assessment/Plan   Diagnoses and all orders for this visit:    1. Obesity, Class III, BMI 40-49.9 (morbid obesity) (HCC) (Primary)    2. Gallstones    lap pete

## 2021-11-16 NOTE — PROGRESS NOTES
Cintia Jasso is a 61 y.o. female.     Chief Complaint: gallstones    History of Present Illness She is a obese 60 yo with history of irritable bowel who has had increasing epigastric pains after eating. She also has nausea and vomiting at times, as well as diarrhea. She has had endoscopies before.     The following portions of the patient's history were reviewed and updated as appropriate: current medications, past family history, past medical history, past social history, past surgical history and problem list.    Review of Systems   Constitutional: Negative for activity change, appetite change, chills, fever and unexpected weight change.   HENT: Negative for congestion, facial swelling and sore throat.    Eyes: Negative for photophobia and visual disturbance.   Respiratory: Negative for chest tightness, shortness of breath and wheezing.    Cardiovascular: Negative for chest pain, palpitations and leg swelling.   Gastrointestinal: Positive for abdominal pain, diarrhea, nausea and vomiting. Negative for abdominal distention, anal bleeding, blood in stool, constipation and rectal pain.   Endocrine: Negative for cold intolerance, heat intolerance, polydipsia and polyuria.   Genitourinary: Negative for difficulty urinating, dysuria, flank pain and urgency.   Musculoskeletal: Negative for back pain and myalgias.   Skin: Negative for rash and wound.   Allergic/Immunologic: Negative for immunocompromised state.   Neurological: Negative for dizziness, seizures, syncope, light-headedness, numbness and headaches.   Hematological: Negative for adenopathy. Does not bruise/bleed easily.   Psychiatric/Behavioral: Negative for behavioral problems and confusion. The patient is not nervous/anxious.        Objective   Physical Exam  Vitals reviewed.   Constitutional:       General: She is not in acute distress.     Appearance: She is well-developed. She is not ill-appearing.   HENT:      Head: Normocephalic. No  laceration. Hair is normal.      Right Ear: Hearing and ear canal normal.      Left Ear: Hearing and ear canal normal.      Nose: Nose normal.      Right Sinus: No maxillary sinus tenderness or frontal sinus tenderness.      Left Sinus: No maxillary sinus tenderness or frontal sinus tenderness.   Eyes:      General: Lids are normal.      Conjunctiva/sclera: Conjunctivae normal.      Pupils: Pupils are equal, round, and reactive to light.   Neck:      Thyroid: No thyroid mass or thyromegaly.      Vascular: No JVD.      Trachea: No tracheal tenderness or tracheal deviation.   Cardiovascular:      Rate and Rhythm: Normal rate and regular rhythm.      Heart sounds: No murmur heard.  No gallop.    Pulmonary:      Effort: Pulmonary effort is normal.      Breath sounds: Normal breath sounds. No stridor. No wheezing.   Chest:      Chest wall: No tenderness.   Breasts:      Right: No supraclavicular adenopathy.      Left: No supraclavicular adenopathy.       Abdominal:      General: Bowel sounds are normal. There is no distension.      Palpations: Abdomen is soft. There is no mass.      Tenderness: There is no abdominal tenderness. There is no guarding or rebound.      Hernia: No hernia is present.   Musculoskeletal:         General: No deformity.      Cervical back: Normal range of motion.   Lymphadenopathy:      Cervical: No cervical adenopathy.      Upper Body:      Right upper body: No supraclavicular adenopathy.      Left upper body: No supraclavicular adenopathy.   Skin:     General: Skin is warm and dry.      Coloration: Skin is not pale.      Findings: No erythema or rash.   Neurological:      Mental Status: She is alert and oriented to person, place, and time.      Motor: No abnormal muscle tone.   Psychiatric:         Behavior: Behavior normal.         Thought Content: Thought content normal.         Past Medical History:   Diagnosis Date   • Anxiety    • Brain tumor (HCC)    • Brain tumor (HCC)    • C. difficile  colitis    • Disease of thyroid gland     s/p thyroidectomy    • Dyspnea    • GERD (gastroesophageal reflux disease)    • Hemorrhoids    • Hypertension    • Kidney infection     treated recently-completed all antibiotics    • Loss, vision, sudden     episodes of vision loss-'black out vision'-reason unknown    • Migraine    • Near syncope     reason unknown    • Nerve pain    • Rash     this summer-rash of unknown origin-has used a cream prn    • Skin rash     BLE/BUE with scattered red bumps, pt states present since Spring 2016   • Swelling of ankle    • Vitamin D deficiency        History reviewed. No pertinent family history.    Social History     Tobacco Use   • Smoking status: Never Smoker   • Smokeless tobacco: Never Used   Vaping Use   • Vaping Use: Never used   Substance Use Topics   • Alcohol use: No   • Drug use: No       Past Surgical History:   Procedure Laterality Date   •  SECTION     • COLONOSCOPY W/ POLYPECTOMY     • HYSTERECTOMY     • OTHER SURGICAL HISTORY      right lateral femoral cutaneous nerve transection at the inguinal ligament    • PERONEAL NERVE DECOMPRESSION Right 2016    Procedure: RIGHT LATERAL FEMORAL CUTANEOUS NERVE TRANSECTION AT LIGUINAL LIGAMENT;  Surgeon: Ethan Tate MD;  Location: [facility];  Service:    • THYROID SURGERY      removed because 'large'   • TONSILLECTOMY         Current Outpatient Medications   Medication Instructions   • Apple Cider Vinegar 600 mg, Oral, Daily   • aspirin 81 mg, Oral, Daily   • clobetasol (TEMOVATE) 0.05 % cream Topical, 2 Times Daily   • diphenhydrAMINE-acetaminophen (TYLENOL PM)  MG tablet per tablet 1 tablet, Oral, Nightly PRN   • HYDROcodone-acetaminophen (NORCO) 7.5-325 MG per tablet 1 tablet, Oral, Nightly PRN   • levothyroxine (SYNTHROID, LEVOTHROID) 200 mcg, Oral, Daily   • montelukast (SINGULAIR) 10 MG tablet No dose, route, or frequency recorded.   • pantoprazole (PROTONIX) 40 mg, Oral, Daily   • traZODone  (DESYREL) 100 mg, Oral, Nightly   • verapamil SR (CALAN-SR) 240 mg, Oral, Every 24 Hours Scheduled   • vitamin D (ERGOCALCIFEROL) 50,000 Units, Oral, Every 7 Days, Takes on wednesdays         Assessment/Plan   Diagnoses and all orders for this visit:    1. Obesity, Class III, BMI 40-49.9 (morbid obesity) (HCC) (Primary)    2. Gallstones    lap pete

## 2021-11-17 ENCOUNTER — PRE-ADMISSION TESTING (OUTPATIENT)
Dept: PREADMISSION TESTING | Facility: HOSPITAL | Age: 61
End: 2021-11-17

## 2021-11-17 ENCOUNTER — LAB (OUTPATIENT)
Dept: LAB | Facility: HOSPITAL | Age: 61
End: 2021-11-17

## 2021-11-17 DIAGNOSIS — K80.20 GALLSTONES: ICD-10-CM

## 2021-11-17 DIAGNOSIS — E66.01 OBESITY, CLASS III, BMI 40-49.9 (MORBID OBESITY) (HCC): ICD-10-CM

## 2021-11-17 LAB
ANION GAP SERPL CALCULATED.3IONS-SCNC: 13 MMOL/L (ref 5–15)
BUN SERPL-MCNC: 13 MG/DL (ref 8–23)
BUN/CREAT SERPL: 16.9 (ref 7–25)
CALCIUM SPEC-SCNC: 8.8 MG/DL (ref 8.6–10.5)
CHLORIDE SERPL-SCNC: 105 MMOL/L (ref 98–107)
CO2 SERPL-SCNC: 23 MMOL/L (ref 22–29)
CREAT SERPL-MCNC: 0.77 MG/DL (ref 0.57–1)
DEPRECATED RDW RBC AUTO: 45.7 FL (ref 37–54)
ERYTHROCYTE [DISTWIDTH] IN BLOOD BY AUTOMATED COUNT: 15.3 % (ref 12.3–15.4)
GFR SERPL CREATININE-BSD FRML MDRD: 76 ML/MIN/1.73
GLUCOSE SERPL-MCNC: 98 MG/DL (ref 65–99)
HCT VFR BLD AUTO: 36.6 % (ref 34–46.6)
HGB BLD-MCNC: 11 G/DL (ref 12–15.9)
MCH RBC QN AUTO: 24.6 PG (ref 26.6–33)
MCHC RBC AUTO-ENTMCNC: 30.1 G/DL (ref 31.5–35.7)
MCV RBC AUTO: 81.7 FL (ref 79–97)
PLATELET # BLD AUTO: 257 10*3/MM3 (ref 140–450)
PMV BLD AUTO: 10.4 FL (ref 6–12)
POTASSIUM SERPL-SCNC: 3.6 MMOL/L (ref 3.5–5.2)
RBC # BLD AUTO: 4.48 10*6/MM3 (ref 3.77–5.28)
SODIUM SERPL-SCNC: 141 MMOL/L (ref 136–145)
WBC NRBC COR # BLD: 11.74 10*3/MM3 (ref 3.4–10.8)

## 2021-11-17 PROCEDURE — 93005 ELECTROCARDIOGRAM TRACING: CPT

## 2021-11-17 PROCEDURE — C9803 HOPD COVID-19 SPEC COLLECT: HCPCS

## 2021-11-17 PROCEDURE — 36415 COLL VENOUS BLD VENIPUNCTURE: CPT

## 2021-11-17 PROCEDURE — 80048 BASIC METABOLIC PNL TOTAL CA: CPT

## 2021-11-17 PROCEDURE — U0004 COV-19 TEST NON-CDC HGH THRU: HCPCS | Performed by: SURGERY

## 2021-11-17 PROCEDURE — 85027 COMPLETE CBC AUTOMATED: CPT

## 2021-11-17 PROCEDURE — 93010 ELECTROCARDIOGRAM REPORT: CPT | Performed by: INTERNAL MEDICINE

## 2021-11-17 RX ORDER — LEVOTHYROXINE SODIUM 0.03 MG/1
25 TABLET ORAL EVERY OTHER DAY
COMMUNITY

## 2021-11-17 NOTE — DISCHARGE INSTRUCTIONS
11/19/21     1100  Arrival time    TAKE the following medications the morning of surgery:  All heart or blood pressure medications    HOLD all diabetic medications the morning of surgery as ordered by physician.    Please discontinue all blood thinners and anticoagulants (except aspirin) prior to surgery as per your surgeon and cardiologist instructions.  Aspirin may be continued up to the day prior to surgery.     CHLORHEXIDINE CLOTHS GIVEN WITH INSTRUCTIONS AND FORM TO RETURN TO HOSPITAL, IF APPLICABLE.    General Instructions:  · Do not eat or drink after midnight: includes water, mints, or gum. You may brush your teeth.  Dental appliances that are removable must be taken out day of surgery.  · Do not smoke, chew tobacco, or drink alcohol.  · Bring medications in original bottles, any inhalers and if applicable your C-PAP/BI-PAP machine.  · Bring any papers given to you in the doctor's office.  · Wear clean comfortable clothes and socks.  · Do not wear contact lenses or make-up. Bring a case for your glasses if applicable.  · Bring crutches or walker if applicable.  · Leave all other valuables and jewelry at home.    If you were given a blood bank ID arm band remember to bring it with you the day of surgery.    Preventing a Surgical Site Infection:  Shower the night before surgery (unless instructed other wise) using a fresh bar of anti-bacterial soap (such as Dial) and clean washcloth. Dry with a clean towel and dress in clean clothing.  For 2 to 3 days before surgery, avoid shaving with a razor near where you will have surgery because the razor can irritate skin and make it easier to develop an infection. Ask your surgeon if you will be receiving antibiotics prior to surgery.  Make sure you, your family, and all healthcare providers clear their hands with soap and water or an alcohol-based hand  before caring for you or your wound.  If at all possible, quit smoking as many days before surgery as you  can.    Day of surgery:  Upon arrival, a Pre-op nurse and Anesthesiologist will review your health history, obtain vital signs, and answer questions you may have. The only belongings needed at this time will be your home medications and if applicable your C-PAP/BI-PAP machine. If you are staying overnight your family can leave the rest of your belongings in the car and bring them to your room later. A Pre-op nurse will start an IV and you may receive medication in preparation for surgery, including something to help you relax. Your family will be able to see you in the Pre-op area. While you are in surgery your family should notify the waiting room  if they leave the waiting room area and provide a contact phone number.    Please be aware that surgery does come with discomfort. We want to make every effort to control your discomfort so please discuss any uncontrolled symptoms with your nurse. Your doctor will most likely have prescribed pain medications.    If you are going home after surgery you will receive individualized written care instructions before being discharged. A responsible adult must drive you to and from the hospital on the day of surgery and stay with you for 24 hours.    If you are staying overnight following surgery, you will be transported to your hospital room following the recovery period.  [facility] has [facility] rooms.    If you have any questions please call Pre-Admission Testing at [phone].  Deductibles and co-payments are collected on the day of service. Please be prepared to pay the required co-pay, deductible or deposit on the day of service as defined by your plan.    A RESPONSIBLE PERSON MUST REMAIN IN THE WAITING ROOM DURING YOUR PROCEDURE AND A RESPONSIBLE  MUST BE AVAILABLE UPON YOUR DISCHARGE.

## 2021-11-18 LAB
QT INTERVAL: 406 MS
QTC INTERVAL: 479 MS
SARS-COV-2 RNA PNL SPEC NAA+PROBE: NOT DETECTED

## 2021-11-19 ENCOUNTER — ANESTHESIA EVENT (OUTPATIENT)
Dept: PERIOP | Facility: HOSPITAL | Age: 61
End: 2021-11-19

## 2021-11-19 ENCOUNTER — ANESTHESIA (OUTPATIENT)
Dept: PERIOP | Facility: HOSPITAL | Age: 61
End: 2021-11-19

## 2021-11-19 ENCOUNTER — HOSPITAL ENCOUNTER (OUTPATIENT)
Facility: HOSPITAL | Age: 61
Setting detail: HOSPITAL OUTPATIENT SURGERY
Discharge: HOME | End: 2021-11-19
Attending: SURGERY | Admitting: SURGERY

## 2021-11-19 VITALS
OXYGEN SATURATION: 97 % | TEMPERATURE: 97.9 F | SYSTOLIC BLOOD PRESSURE: 152 MMHG | WEIGHT: 259 LBS | BODY MASS INDEX: 39.25 KG/M2 | DIASTOLIC BLOOD PRESSURE: 79 MMHG | HEIGHT: 68 IN | RESPIRATION RATE: 18 BRPM | HEART RATE: 78 BPM

## 2021-11-19 DIAGNOSIS — K80.20 GALLSTONES: ICD-10-CM

## 2021-11-19 DIAGNOSIS — E66.01 OBESITY, CLASS III, BMI 40-49.9 (MORBID OBESITY) (HCC): ICD-10-CM

## 2021-11-19 PROCEDURE — 25010000002 ONDANSETRON PER 1 MG: Performed by: NURSE ANESTHETIST, CERTIFIED REGISTERED

## 2021-11-19 PROCEDURE — 25010000002 PROPOFOL 10 MG/ML EMULSION: Performed by: NURSE ANESTHETIST, CERTIFIED REGISTERED

## 2021-11-19 PROCEDURE — 25010000002 KETOROLAC TROMETHAMINE PER 15 MG: Performed by: NURSE ANESTHETIST, CERTIFIED REGISTERED

## 2021-11-19 PROCEDURE — C1889 IMPLANT/INSERT DEVICE, NOC: HCPCS | Performed by: SURGERY

## 2021-11-19 PROCEDURE — 47562 LAPAROSCOPIC CHOLECYSTECTOMY: CPT | Performed by: SURGERY

## 2021-11-19 PROCEDURE — 25010000002 FENTANYL CITRATE (PF) 50 MCG/ML SOLUTION: Performed by: NURSE ANESTHETIST, CERTIFIED REGISTERED

## 2021-11-19 PROCEDURE — 25010000002 NEOSTIGMINE 10 MG/10ML SOLUTION: Performed by: NURSE ANESTHETIST, CERTIFIED REGISTERED

## 2021-11-19 PROCEDURE — 0 MEPERIDINE PER 100 MG: Performed by: ANESTHESIOLOGY

## 2021-11-19 PROCEDURE — 25010000002 MIDAZOLAM PER 1 MG: Performed by: NURSE ANESTHETIST, CERTIFIED REGISTERED

## 2021-11-19 DEVICE — LIGACLIP 10-M/L, 10MM ENDOSCOPIC ROTATING MULTIPLE CLIP APPLIERS
Type: IMPLANTABLE DEVICE | Site: ABDOMEN | Status: FUNCTIONAL
Brand: LIGACLIP

## 2021-11-19 RX ORDER — ONDANSETRON 2 MG/ML
4 INJECTION INTRAMUSCULAR; INTRAVENOUS AS NEEDED
Status: DISCONTINUED | OUTPATIENT
Start: 2021-11-19 | End: 2021-11-19 | Stop reason: HOSPADM

## 2021-11-19 RX ORDER — LIDOCAINE HYDROCHLORIDE 20 MG/ML
INJECTION, SOLUTION INFILTRATION; PERINEURAL AS NEEDED
Status: DISCONTINUED | OUTPATIENT
Start: 2021-11-19 | End: 2021-11-19 | Stop reason: SURG

## 2021-11-19 RX ORDER — HYDROCODONE BITARTRATE AND ACETAMINOPHEN 5; 325 MG/1; MG/1
1 TABLET ORAL EVERY 4 HOURS PRN
Qty: 12 TABLET | Refills: 0 | Status: SHIPPED | OUTPATIENT
Start: 2021-11-19 | End: 2021-11-29

## 2021-11-19 RX ORDER — SODIUM CHLORIDE, SODIUM LACTATE, POTASSIUM CHLORIDE, CALCIUM CHLORIDE 600; 310; 30; 20 MG/100ML; MG/100ML; MG/100ML; MG/100ML
125 INJECTION, SOLUTION INTRAVENOUS ONCE
Status: COMPLETED | OUTPATIENT
Start: 2021-11-19 | End: 2021-11-19

## 2021-11-19 RX ORDER — FAMOTIDINE 10 MG/ML
INJECTION, SOLUTION INTRAVENOUS AS NEEDED
Status: DISCONTINUED | OUTPATIENT
Start: 2021-11-19 | End: 2021-11-19 | Stop reason: SURG

## 2021-11-19 RX ORDER — MIDAZOLAM HYDROCHLORIDE 1 MG/ML
1 INJECTION INTRAMUSCULAR; INTRAVENOUS
Status: DISCONTINUED | OUTPATIENT
Start: 2021-11-19 | End: 2021-11-19 | Stop reason: HOSPADM

## 2021-11-19 RX ORDER — HYDROCODONE BITARTRATE AND ACETAMINOPHEN 5; 325 MG/1; MG/1
1 TABLET ORAL EVERY 4 HOURS PRN
Status: DISCONTINUED | OUTPATIENT
Start: 2021-11-19 | End: 2021-11-19 | Stop reason: HOSPADM

## 2021-11-19 RX ORDER — BUPIVACAINE HYDROCHLORIDE AND EPINEPHRINE 2.5; 5 MG/ML; UG/ML
INJECTION, SOLUTION EPIDURAL; INFILTRATION; INTRACAUDAL; PERINEURAL AS NEEDED
Status: DISCONTINUED | OUTPATIENT
Start: 2021-11-19 | End: 2021-11-19 | Stop reason: HOSPADM

## 2021-11-19 RX ORDER — MEPERIDINE HYDROCHLORIDE 25 MG/ML
25 INJECTION INTRAMUSCULAR; INTRAVENOUS; SUBCUTANEOUS ONCE
Status: COMPLETED | OUTPATIENT
Start: 2021-11-19 | End: 2021-11-19

## 2021-11-19 RX ORDER — KETOROLAC TROMETHAMINE 30 MG/ML
30 INJECTION, SOLUTION INTRAMUSCULAR; INTRAVENOUS EVERY 6 HOURS PRN
Status: COMPLETED | OUTPATIENT
Start: 2021-11-19 | End: 2021-11-19

## 2021-11-19 RX ORDER — NEOSTIGMINE METHYLSULFATE 1 MG/ML
INJECTION, SOLUTION INTRAVENOUS AS NEEDED
Status: DISCONTINUED | OUTPATIENT
Start: 2021-11-19 | End: 2021-11-19 | Stop reason: SURG

## 2021-11-19 RX ORDER — FENTANYL CITRATE 50 UG/ML
INJECTION, SOLUTION INTRAMUSCULAR; INTRAVENOUS AS NEEDED
Status: DISCONTINUED | OUTPATIENT
Start: 2021-11-19 | End: 2021-11-19 | Stop reason: SURG

## 2021-11-19 RX ORDER — PROPOFOL 10 MG/ML
VIAL (ML) INTRAVENOUS AS NEEDED
Status: DISCONTINUED | OUTPATIENT
Start: 2021-11-19 | End: 2021-11-19 | Stop reason: SURG

## 2021-11-19 RX ORDER — ONDANSETRON 2 MG/ML
INJECTION INTRAMUSCULAR; INTRAVENOUS AS NEEDED
Status: DISCONTINUED | OUTPATIENT
Start: 2021-11-19 | End: 2021-11-19 | Stop reason: SURG

## 2021-11-19 RX ORDER — DROPERIDOL 2.5 MG/ML
0.62 INJECTION, SOLUTION INTRAMUSCULAR; INTRAVENOUS ONCE AS NEEDED
Status: DISCONTINUED | OUTPATIENT
Start: 2021-11-19 | End: 2021-11-19 | Stop reason: HOSPADM

## 2021-11-19 RX ORDER — SODIUM CHLORIDE, SODIUM LACTATE, POTASSIUM CHLORIDE, CALCIUM CHLORIDE 600; 310; 30; 20 MG/100ML; MG/100ML; MG/100ML; MG/100ML
125 INJECTION, SOLUTION INTRAVENOUS ONCE
Status: DISCONTINUED | OUTPATIENT
Start: 2021-11-19 | End: 2021-11-19 | Stop reason: HOSPADM

## 2021-11-19 RX ORDER — SODIUM CHLORIDE 0.9 % (FLUSH) 0.9 %
10 SYRINGE (ML) INJECTION EVERY 12 HOURS SCHEDULED
Status: DISCONTINUED | OUTPATIENT
Start: 2021-11-19 | End: 2021-11-19 | Stop reason: HOSPADM

## 2021-11-19 RX ORDER — ROCURONIUM BROMIDE 10 MG/ML
INJECTION, SOLUTION INTRAVENOUS AS NEEDED
Status: DISCONTINUED | OUTPATIENT
Start: 2021-11-19 | End: 2021-11-19 | Stop reason: SURG

## 2021-11-19 RX ORDER — GLYCOPYRROLATE 0.2 MG/ML
INJECTION INTRAMUSCULAR; INTRAVENOUS AS NEEDED
Status: DISCONTINUED | OUTPATIENT
Start: 2021-11-19 | End: 2021-11-19 | Stop reason: SURG

## 2021-11-19 RX ORDER — SODIUM CHLORIDE 0.9 % (FLUSH) 0.9 %
10 SYRINGE (ML) INJECTION AS NEEDED
Status: DISCONTINUED | OUTPATIENT
Start: 2021-11-19 | End: 2021-11-19 | Stop reason: HOSPADM

## 2021-11-19 RX ORDER — FENTANYL CITRATE 50 UG/ML
50 INJECTION, SOLUTION INTRAMUSCULAR; INTRAVENOUS
Status: DISCONTINUED | OUTPATIENT
Start: 2021-11-19 | End: 2021-11-19 | Stop reason: HOSPADM

## 2021-11-19 RX ORDER — MIDAZOLAM HYDROCHLORIDE 1 MG/ML
INJECTION INTRAMUSCULAR; INTRAVENOUS AS NEEDED
Status: DISCONTINUED | OUTPATIENT
Start: 2021-11-19 | End: 2021-11-19 | Stop reason: SURG

## 2021-11-19 RX ORDER — SODIUM CHLORIDE 9 MG/ML
INJECTION, SOLUTION INTRAVENOUS AS NEEDED
Status: DISCONTINUED | OUTPATIENT
Start: 2021-11-19 | End: 2021-11-19 | Stop reason: HOSPADM

## 2021-11-19 RX ORDER — MAGNESIUM HYDROXIDE 1200 MG/15ML
LIQUID ORAL AS NEEDED
Status: DISCONTINUED | OUTPATIENT
Start: 2021-11-19 | End: 2021-11-19 | Stop reason: HOSPADM

## 2021-11-19 RX ORDER — SODIUM CHLORIDE, SODIUM LACTATE, POTASSIUM CHLORIDE, CALCIUM CHLORIDE 600; 310; 30; 20 MG/100ML; MG/100ML; MG/100ML; MG/100ML
INJECTION, SOLUTION INTRAVENOUS CONTINUOUS PRN
Status: DISCONTINUED | OUTPATIENT
Start: 2021-11-19 | End: 2021-11-19 | Stop reason: SURG

## 2021-11-19 RX ORDER — SODIUM CHLORIDE, SODIUM LACTATE, POTASSIUM CHLORIDE, CALCIUM CHLORIDE 600; 310; 30; 20 MG/100ML; MG/100ML; MG/100ML; MG/100ML
100 INJECTION, SOLUTION INTRAVENOUS ONCE AS NEEDED
Status: DISCONTINUED | OUTPATIENT
Start: 2021-11-19 | End: 2021-11-19 | Stop reason: HOSPADM

## 2021-11-19 RX ORDER — OXYCODONE HYDROCHLORIDE AND ACETAMINOPHEN 5; 325 MG/1; MG/1
1 TABLET ORAL ONCE AS NEEDED
Status: COMPLETED | OUTPATIENT
Start: 2021-11-19 | End: 2021-11-19

## 2021-11-19 RX ORDER — IPRATROPIUM BROMIDE AND ALBUTEROL SULFATE 2.5; .5 MG/3ML; MG/3ML
3 SOLUTION RESPIRATORY (INHALATION) ONCE AS NEEDED
Status: DISCONTINUED | OUTPATIENT
Start: 2021-11-19 | End: 2021-11-19 | Stop reason: HOSPADM

## 2021-11-19 RX ADMIN — MEPERIDINE HYDROCHLORIDE 25 MG: 25 INJECTION INTRAMUSCULAR; INTRAVENOUS; SUBCUTANEOUS at 14:33

## 2021-11-19 RX ADMIN — SODIUM CHLORIDE, POTASSIUM CHLORIDE, SODIUM LACTATE AND CALCIUM CHLORIDE: 600; 310; 30; 20 INJECTION, SOLUTION INTRAVENOUS at 13:52

## 2021-11-19 RX ADMIN — ONDANSETRON 4 MG: 2 INJECTION INTRAMUSCULAR; INTRAVENOUS at 13:57

## 2021-11-19 RX ADMIN — FENTANYL CITRATE 50 MCG: 50 INJECTION INTRAMUSCULAR; INTRAVENOUS at 14:21

## 2021-11-19 RX ADMIN — FAMOTIDINE 20 MG: 10 INJECTION INTRAVENOUS at 13:57

## 2021-11-19 RX ADMIN — GLYCOPYRROLATE 0.4 MG: 0.2 INJECTION, SOLUTION INTRAMUSCULAR; INTRAVENOUS at 14:09

## 2021-11-19 RX ADMIN — OXYCODONE HYDROCHLORIDE AND ACETAMINOPHEN 1 TABLET: 5; 325 TABLET ORAL at 15:06

## 2021-11-19 RX ADMIN — MIDAZOLAM 2 MG: 1 INJECTION INTRAMUSCULAR; INTRAVENOUS at 13:52

## 2021-11-19 RX ADMIN — LIDOCAINE HYDROCHLORIDE 60 MG: 20 INJECTION, SOLUTION INFILTRATION; PERINEURAL at 13:57

## 2021-11-19 RX ADMIN — NEOSTIGMINE 3 MG: 1 INJECTION INTRAVENOUS at 14:09

## 2021-11-19 RX ADMIN — KETOROLAC TROMETHAMINE 30 MG: 30 INJECTION, SOLUTION INTRAMUSCULAR; INTRAVENOUS at 14:21

## 2021-11-19 RX ADMIN — FENTANYL CITRATE 50 MCG: 50 INJECTION INTRAMUSCULAR; INTRAVENOUS at 14:29

## 2021-11-19 RX ADMIN — FENTANYL CITRATE 100 MCG: 50 INJECTION INTRAMUSCULAR; INTRAVENOUS at 13:52

## 2021-11-19 RX ADMIN — ROCURONIUM BROMIDE 20 MG: 10 SOLUTION INTRAVENOUS at 13:57

## 2021-11-19 RX ADMIN — PROPOFOL 150 MG: 10 INJECTION, EMULSION INTRAVENOUS at 13:57

## 2021-11-19 RX ADMIN — SODIUM CHLORIDE, POTASSIUM CHLORIDE, SODIUM LACTATE AND CALCIUM CHLORIDE 125 ML/HR: 600; 310; 30; 20 INJECTION, SOLUTION INTRAVENOUS at 12:06

## 2021-11-19 NOTE — ANESTHESIA PREPROCEDURE EVALUATION
Anesthesia Evaluation     Patient summary reviewed and Nursing notes reviewed   no history of anesthetic complications:  NPO Solid Status: > 8 hours  NPO Liquid Status: > 8 hours           Airway   Mallampati: II  TM distance: <3 FB  Neck ROM: full  Large neck circumference  Dental - normal exam   (+) poor dentition    Comment: Multiple missing, none loose    Pulmonary - normal exam   (+) shortness of breath,   Cardiovascular - normal exam    ECG reviewed  Rhythm: regular    (+) hypertension,       Neuro/Psych  (+) headaches, numbness, psychiatric history Anxiety,       ROS Comment: History of Brain Tumor    FINDINGS:       Brain volume is within normal limits.  Ventricles are normal in size and  configuration. Mesial temporal lobe structures are reasonably  symmetrical in size and signal. There is no evidence of cortical  dysplasia or gray matter heterotopia.  There is no significant abnormal  signal on FLAIR or diffusion weighted images. There is no abnormal  parenchymal or extra-axial enhancement. There is no mass effect and the  basal cisterns are patent. [place] flow voids are maintained.   No significant abnormal sinonasal or temporal bone fluid is identified.     IMPRESSION:  Normal brain MRI.   GI/Hepatic/Renal/Endo    (+) obesity, morbid obesity, GERD, GI bleeding , liver disease history of elevated LFT, renal disease, thyroid problem hypothyroidism    Musculoskeletal     (+) radiculopathy  Abdominal   (+) obese,     Bowel sounds: normal.   Substance History - negative use     OB/GYN negative ob/gyn ROS         Other - negative ROS       (-) history of cancer                  Anesthesia Plan    ASA 3     general     intravenous induction     Anesthetic plan, all risks, benefits, and alternatives have been provided, discussed and informed consent has been obtained with: patient.  Use of blood products discussed with patient  Consented to blood products.       Lab Results   Component Value Date     WBC 11.74 (H) 11/17/2021    HGB 11.0 (L) 11/17/2021    HCT 36.6 11/17/2021    MCV 81.7 11/17/2021     11/17/2021         Lab Results   Component Value Date    GLUCOSE 98 11/17/2021    BUN 13 11/17/2021    CREATININE 0.77 11/17/2021    EGFRIFNONA 76 11/17/2021    BCR 16.9 11/17/2021    K 3.6 11/17/2021    CO2 23.0 11/17/2021    CALCIUM 8.8 11/17/2021    ALBUMIN 4.80 10/30/2018    LABIL2 1.6 05/28/2016    AST 31 (H) 10/30/2018    ALT 40 (H) 10/30/2018

## 2021-11-19 NOTE — ANESTHESIA PROCEDURE NOTES
Airway  Urgency: elective    Date/Time: 11/19/2021 1:57 PM  Airway not difficult    General Information and Staff    Patient location during procedure: OR  Anesthesiologist: Lázaro Kumari MD  CRNA: Sergio Barahona CRNA    Indications and Patient Condition  Indications for airway management: airway protection    Preoxygenated: yes  MILS maintained throughout  Mask difficulty assessment: 0 - not attempted    Final Airway Details  Final airway type: endotracheal airway      Successful airway: ETT  Cuffed: yes   Successful intubation technique: direct laryngoscopy  Facilitating devices/methods: intubating stylet  Endotracheal tube insertion site: oral  Blade: Heidi  Blade size: 3  ETT size (mm): 7.0  Cormack-Lehane Classification: grade IIa - partial view of glottis  Placement verified by: chest auscultation, capnometry and palpation of cuff   Cuff volume (mL): 10  Measured from: lips  ETT/EBT  to lips (cm): 21  Number of attempts at approach: 1  Assessment: lips, teeth, and gum same as pre-op and atraumatic intubation    Additional Comments  Dentition and lips same as preop

## 2021-11-19 NOTE — ANESTHESIA POSTPROCEDURE EVALUATION
Patient: Trevon Jasso    Procedure Summary     Date: 11/19/21 Room / Location: [facility] OR 01 /  [facility] OR    Anesthesia Start: 1352 Anesthesia Stop: 1416    Procedure: CHOLECYSTECTOMY LAPAROSCOPIC (N/A Abdomen) Diagnosis:       Obesity, Class III, BMI 40-49.9 (morbid obesity) ([facility])      Gallstones      (Obesity, Class III, BMI 40-49.9 (morbid obesity) (HCC) [E66.01])      (Gallstones [K80.20])    Surgeons: Guillaume Fortune MD Provider: Lázaro Kumari MD    Anesthesia Type: general ASA Status: 3          Anesthesia Type: general    Vitals  No vitals data found for the desired time range.          Post Anesthesia Care and Evaluation    Patient location during evaluation: PACU  Patient participation: complete - patient participated  Level of consciousness: responsive to painful stimuli  Pain score: 9  Pain management: inadequate  Airway patency: patent  Anesthetic complications: No anesthetic complications  PONV Status: none  Cardiovascular status: hemodynamically stable  Respiratory status: nasal cannula  Hydration status: acceptable    Comments: Pain medicine ordered and to be given.

## 2021-11-19 NOTE — OP NOTE
CHOLECYSTECTOMY LAPAROSCOPIC  Procedure Note    Trevon Jasso  11/19/2021    Pre-op Diagnosis:   Obesity, Class III, BMI 40-49.9 (morbid obesity) (HCC) [E66.01]  Gallstones [K80.20]    Post-op Diagnosis: same, fatty liver        Procedure(s):  CHOLECYSTECTOMY LAPAROSCOPIC    Surgeon(s):  Guillaume Fortune MD    Anesthesia: General    Staff:   Circulator: Gaby Oreilly RN  Scrub Person: Opal Fragoso  Assistant: Guillaume Bello    Estimated Blood Loss: minimal    Specimens:                Order Name Source Comment Collection Info Order Time   SURGICAL PATHOLOGY EXAM Gallbladder  Collected By: Guillaume Fortune MD 11/19/2021  2:09 PM     Collection Date   11/19/2021          Collection Time    2:09 PM          Release to patient   Immediate              Drains: * No LDAs found *    Procedure: The abdomen was prepped and draped. Two 5 mm and one 11 mm port placed. The liver was enlarged and fatty. The cystic duct and artery were dissected out, clipped and divided. The gallbladder was taken off the liver with cautery and brought out the upper midline port. The area was suctioned and irrigated. The gas was allowed to escape and the ports removed. They were closed with vicryl and local injected.     Findings:      Complications: none   Grafts / Implants N/A    Guillaume Fortune MD     Date: 11/19/2021  Time: 14:15 EST

## 2021-11-22 LAB — LAB AP CASE REPORT: NORMAL

## 2021-12-02 ENCOUNTER — OFFICE VISIT (OUTPATIENT)
Dept: SURGERY | Facility: CLINIC | Age: 61
End: 2021-12-02

## 2021-12-02 VITALS — HEIGHT: 68 IN | BODY MASS INDEX: 39.25 KG/M2 | WEIGHT: 259 LBS

## 2021-12-02 DIAGNOSIS — Z90.49 STATUS POST LAPAROSCOPIC CHOLECYSTECTOMY: Primary | ICD-10-CM

## 2021-12-02 PROCEDURE — 99024 POSTOP FOLLOW-UP VISIT: CPT | Performed by: SURGERY

## 2021-12-02 NOTE — PROGRESS NOTES
Subjective   Trevon Jasso is a 61 y.o. female.     Chief Complaint:  Post lap pete    History of Present Illness She is doing well post lap pete.    The following portions of the patient's history were reviewed and updated as appropriate: current medications, past family history, past medical history, past social history, past surgical history and problem list.    Review of Systems    Objective   Physical Exam wounds ok    Past Medical History:   Diagnosis Date   • Anxiety    • Brain tumor (HCC)     brain spur   • Brain tumor (HCC)    • C. difficile colitis    • Disease of thyroid gland     s/p thyroidectomy    • Dyspnea    • GERD (gastroesophageal reflux disease)    • Hemorrhoids    • Kidney infection     treated recently-completed all antibiotics    • Loss, vision, sudden     episodes of vision loss-'black out vision'-reason unknown    • Migraine    • Near syncope     reason unknown    • Nerve pain    • Rash     this summer-rash of unknown origin-has used a cream prn    • Skin rash     BLE/BUE with scattered red bumps, pt states present since Spring 2016   • Swelling of ankle    • Vitamin D deficiency        History reviewed. No pertinent family history.    Social History     Tobacco Use   • Smoking status: Never Smoker   • Smokeless tobacco: Never Used   Vaping Use   • Vaping Use: Never used   Substance Use Topics   • Alcohol use: No   • Drug use: No       Past Surgical History:   Procedure Laterality Date   •  SECTION     • CHOLECYSTECTOMY N/A 2021    Procedure: CHOLECYSTECTOMY LAPAROSCOPIC;  Surgeon: Guillaume Fortune MD;  Location: [facility];  Service: General;  Laterality: N/A;   • COLONOSCOPY     • COLONOSCOPY W/ POLYPECTOMY     • ENDOSCOPY     • HYSTERECTOMY     • OTHER SURGICAL HISTORY      right lateral femoral cutaneous nerve transection at the inguinal ligament    • PERONEAL NERVE DECOMPRESSION Right 2016    Procedure: RIGHT LATERAL FEMORAL CUTANEOUS NERVE TRANSECTION AT  LIGUINAL LIGAMENT;  Surgeon: Ethan Tate MD;  Location: [facility];  Service:    • THYROID SURGERY      removed because 'large'   • TONSILLECTOMY         Current Outpatient Medications   Medication Instructions   • levothyroxine (SYNTHROID, LEVOTHROID) 200 mcg, Oral, Daily   • levothyroxine (SYNTHROID, LEVOTHROID) 25 mcg, Oral, Every Other Day   • montelukast (SINGULAIR) 10 MG tablet No dose, route, or frequency recorded.   • pantoprazole (PROTONIX) 40 mg, Oral, Daily   • verapamil SR (CALAN-SR) 240 mg, Oral, Every 24 Hours Scheduled         Assessment/Plan   Diagnoses and all orders for this visit:    1. Status post laparoscopic cholecystectomy (Primary)    return prn

## 2022-02-17 ENCOUNTER — APPOINTMENT (OUTPATIENT)
Dept: MAMMOGRAPHY | Facility: HOSPITAL | Age: 62
End: 2022-02-17

## 2022-03-30 ENCOUNTER — HOSPITAL ENCOUNTER (OUTPATIENT)
Dept: MAMMOGRAPHY | Facility: HOSPITAL | Age: 62
Discharge: HOME | End: 2022-03-30
Admitting: FAMILY MEDICINE

## 2022-03-30 DIAGNOSIS — Z12.31 VISIT FOR SCREENING MAMMOGRAM: ICD-10-CM

## 2022-03-30 PROCEDURE — 77063 BREAST TOMOSYNTHESIS BI: CPT

## 2022-03-30 PROCEDURE — 77067 SCR MAMMO BI INCL CAD: CPT | Performed by: RADIOLOGY

## 2022-03-30 PROCEDURE — 77063 BREAST TOMOSYNTHESIS BI: CPT | Performed by: RADIOLOGY

## 2022-03-30 PROCEDURE — 77067 SCR MAMMO BI INCL CAD: CPT

## 2022-03-31 ENCOUNTER — TRANSCRIBE ORDERS (OUTPATIENT)
Dept: ADMINISTRATIVE | Facility: HOSPITAL | Age: 62
End: 2022-03-31

## 2022-03-31 DIAGNOSIS — M54.2 CERVICALGIA: Primary | ICD-10-CM

## 2022-04-06 ENCOUNTER — HOSPITAL ENCOUNTER (OUTPATIENT)
Dept: MAMMOGRAPHY | Facility: HOSPITAL | Age: 62
Discharge: HOME | End: 2022-04-06
Admitting: RADIOLOGY

## 2022-04-06 DIAGNOSIS — R92.8 ABNORMAL MAMMOGRAM OF LEFT BREAST: ICD-10-CM

## 2022-04-06 PROCEDURE — 77065 DX MAMMO INCL CAD UNI: CPT | Performed by: RADIOLOGY

## 2022-04-06 PROCEDURE — G0279 TOMOSYNTHESIS, MAMMO: HCPCS

## 2022-04-06 PROCEDURE — 77065 DX MAMMO INCL CAD UNI: CPT

## 2022-04-06 PROCEDURE — 77061 BREAST TOMOSYNTHESIS UNI: CPT | Performed by: RADIOLOGY

## 2022-04-19 ENCOUNTER — HOSPITAL ENCOUNTER (OUTPATIENT)
Dept: MAMMOGRAPHY | Facility: HOSPITAL | Age: 62
Discharge: HOME | End: 2022-04-19

## 2022-04-19 DIAGNOSIS — R92.8 ABNORMAL MAMMOGRAM: ICD-10-CM

## 2022-04-19 PROCEDURE — A4648 IMPLANTABLE TISSUE MARKER: HCPCS

## 2022-04-19 PROCEDURE — 77065 DX MAMMO INCL CAD UNI: CPT | Performed by: RADIOLOGY

## 2022-04-19 PROCEDURE — 19081 BX BREAST 1ST LESION STRTCTC: CPT | Performed by: RADIOLOGY

## 2022-04-21 ENCOUNTER — TELEPHONE (OUTPATIENT)
Dept: MAMMOGRAPHY | Facility: HOSPITAL | Age: 62
End: 2022-04-21

## 2022-04-21 LAB
LAB AP CASE REPORT: NORMAL
PATH REPORT.FINAL DX SPEC: NORMAL

## 2022-04-21 NOTE — TELEPHONE ENCOUNTER
Pt and pt daughter, Summer, given biopsy results and surgical consultation appointment    ----- Message from Luz Trevino MD sent at 4/21/2022  2:53 PM EDT -----  PATHOLOGY:    Focal benign sclerosing lesion. Fibrocystic change, characterized by moderate to florid intraductal hyperplasia of usual type, adenosis, apocrine metaplasia, stromal fibrosis and cyst formation. Benign calcifications.    The pathology results are felt to be concordant with the imaging findings.    Recommend surgical consultation and excisional biopsy.    The patient will be notified of the results and recommendations by our breast care nurse.

## 2022-05-02 ENCOUNTER — APPOINTMENT (OUTPATIENT)
Dept: CT IMAGING | Facility: HOSPITAL | Age: 62
End: 2022-05-02

## 2022-05-05 ENCOUNTER — OFFICE VISIT (OUTPATIENT)
Dept: SURGERY | Facility: CLINIC | Age: 62
End: 2022-05-05

## 2022-05-05 VITALS
SYSTOLIC BLOOD PRESSURE: 126 MMHG | HEART RATE: 88 BPM | HEIGHT: 67 IN | WEIGHT: 250 LBS | DIASTOLIC BLOOD PRESSURE: 84 MMHG | BODY MASS INDEX: 39.24 KG/M2

## 2022-05-05 DIAGNOSIS — R92.8 ABNORMAL MAMMOGRAM: Primary | ICD-10-CM

## 2022-05-05 DIAGNOSIS — Z01.818 PRE-OP TESTING: Primary | ICD-10-CM

## 2022-05-05 PROCEDURE — 99214 OFFICE O/P EST MOD 30 MIN: CPT | Performed by: SURGERY

## 2022-05-05 PROCEDURE — 76642 ULTRASOUND BREAST LIMITED: CPT | Performed by: SURGERY

## 2022-05-05 RX ORDER — CEFAZOLIN SODIUM 2 G/50ML
2 SOLUTION INTRAVENOUS ONCE
Status: CANCELLED | OUTPATIENT
Start: 2022-05-10 | End: 2022-05-05

## 2022-05-05 NOTE — PROGRESS NOTES
Subjective   Trevon Jasso is a 61 y.o. female here today for pathology review.    History of Present Illness  Ms. Jasso was seen in the office today for breast evaluation.  Patient had an abnormality identified in the left breast on screening mammogram.  She then underwent diagnostic mammogram and biopsy which demonstrated findings consistent with a radial scar for which excisional biopsy was recommended.  Patient denies a palpable mass or nipple discharge.  There is no prior history of breast biopsy or cyst aspiration.  There is no family history of breast or ovarian cancer.  The patient is postmenopausal and not on hormone replacement therapy.  Imaging/biopsy  3/30/2022- bilateral screening mammogram demonstrating left breast architectural distortion  4/6/2022- diagnostic left mammogram demonstrating persistence of the architectural distortion  4/19/2022- biopsy with postbiopsy left mammogram demonstrating marking clip to be in good position  Allergies   Allergen Reactions   • Poison Ivy Extract Hives and Rash     Skin blisters     • Poison Oak Extract Hives and Rash     Skin blisters      Current Outpatient Medications   Medication Sig Dispense Refill   • levothyroxine (SYNTHROID, LEVOTHROID) 200 MCG tablet Take 200 mcg by mouth daily.     • levothyroxine (SYNTHROID, LEVOTHROID) 25 MCG tablet Take 25 mcg by mouth Every Other Day.     • montelukast (SINGULAIR) 10 MG tablet      • pantoprazole (PROTONIX) 40 MG EC tablet Take 40 mg by mouth daily.     • verapamil SR (CALAN-SR) 240 MG CR tablet Take 1 tablet by mouth Daily. 30 tablet 3     No current facility-administered medications for this visit.     Past Medical History:   Diagnosis Date   • Anxiety    • Brain tumor (HCC)     brain spur   • Brain tumor (HCC)    • C. difficile colitis 2015   • Disease of thyroid gland     s/p thyroidectomy    • Dyspnea    • GERD (gastroesophageal reflux disease)    • Hemorrhoids    • Kidney infection     treated  "recently-completed all antibiotics    • Loss, vision, sudden     episodes of vision loss-'black out vision'-reason unknown    • Migraine    • Near syncope     reason unknown    • Nerve pain    • Rash     this summer-rash of unknown origin-has used a cream prn    • Skin rash     BLE/BUE with scattered red bumps, pt states present since Spring 2016   • Swelling of ankle    • Vitamin D deficiency      Past Surgical History:   Procedure Laterality Date   •  SECTION     • CHOLECYSTECTOMY N/A 2021    Procedure: CHOLECYSTECTOMY LAPAROSCOPIC;  Surgeon: Guillaume Fortune MD;  Location: [facility] OR;  Service: General;  Laterality: N/A;   • COLONOSCOPY     • COLONOSCOPY W/ POLYPECTOMY     • ENDOSCOPY     • HYSTERECTOMY      benign   • OTHER SURGICAL HISTORY      right lateral femoral cutaneous nerve transection at the inguinal ligament    • PERONEAL NERVE DECOMPRESSION Right 2016    Procedure: RIGHT LATERAL FEMORAL CUTANEOUS NERVE TRANSECTION AT LIGUINAL LIGAMENT;  Surgeon: Ethan Tate MD;  Location: [facility] OR;  Service:    • THYROID SURGERY      removed because 'large'   • TONSILLECTOMY         Pertinent Review of Systems:  Respiratory: no shortness of breath  Cardiovascular: no chest pain  Other pertinent:      Objective   /84 (BP Location: Left arm)   Pulse 88   Ht 170.2 cm (67\")   Wt 113 kg (250 lb)   LMP  (LMP Unknown)   BMI 39.16 kg/m²   Physical Exam  Well-developed well-nourished female no acute distress  Neck:  No supraclavicular adenopathy  Lungs:  Respiratory effort normal. Auscultation: Clear, without wheezes, rhonchi, rales  Heart:  Regular rate and rhythm, without murmur, gallop, rub.  No pedal edema  Breasts: On visual inspection the breasts are symmetrical.  Examination of the right breast demonstrates no discrete mass, skin change, or axillary adenopathy.  Examination of the left breast demonstrates a small incision at the biopsy site with mild surrounding bruising in the 4 " to 5 o'clock position.  There is no palpable mass or adenopathy.     Procedures   Limited left breast Ultrasound    Indication: Evaluation for ultrasound localization    Description: With use of the 12.5 MHz transducer the area of clinical concern was scanned in multiple planes.    Findings: The left lower quadrant was scanned in multiple planes.  A hypoechoic area with what appears to be a clip is identified to the 3 cm immediately superior to the skin incision.  This corresponds to findings on post biopsy mammogram    Impression: Area of concern is amenable to ultrasound localization    Recommendation:  Followup as clinically indicated  Results/Data:  Imaging: Mammogram reports and images from 3/30/2022, 4/6/2022 and 4/19/2022 were reviewed.  I agree with the assessment  Notes:   Lab:   Other:Pathology report was reviewed.   I agree with the assessment and recommendation    Assessment/Plan   Abnormal mammogram left breast demonstrating radial scar    Proceed with left breast excisional biopsy with ultrasound localization         Discussion/Summary    Time spent:     Class 2 Severe Obesity (BMI >=35 and <=39.9). Obesity-related health conditions include the following: none. Obesity is newly identified. BMI is is above average; BMI management plan is completed. We discussed low calorie, low carb based diet program, portion control and increasing exercise.       Future Appointments   Date Time Provider Department Center   5/26/2022  9:45 AM [facility] CT 1  [facility]         Please note that portions of this note were completed with a voice recognition program.

## 2022-05-06 ENCOUNTER — PRE-ADMISSION TESTING (OUTPATIENT)
Dept: PREADMISSION TESTING | Facility: HOSPITAL | Age: 62
End: 2022-05-06

## 2022-05-06 ENCOUNTER — LAB (OUTPATIENT)
Dept: LAB | Facility: HOSPITAL | Age: 62
End: 2022-05-06

## 2022-05-06 DIAGNOSIS — R92.8 ABNORMAL MAMMOGRAM: ICD-10-CM

## 2022-05-06 DIAGNOSIS — Z01.818 PRE-OP TESTING: ICD-10-CM

## 2022-05-06 LAB
ANION GAP SERPL CALCULATED.3IONS-SCNC: 9.2 MMOL/L (ref 5–15)
BASOPHILS # BLD AUTO: 0.05 10*3/MM3 (ref 0–0.2)
BASOPHILS NFR BLD AUTO: 0.6 % (ref 0–1.5)
BUN SERPL-MCNC: 9 MG/DL (ref 8–23)
BUN/CREAT SERPL: 13.4 (ref 7–25)
CALCIUM SPEC-SCNC: 9.3 MG/DL (ref 8.6–10.5)
CHLORIDE SERPL-SCNC: 106 MMOL/L (ref 98–107)
CO2 SERPL-SCNC: 25.8 MMOL/L (ref 22–29)
CREAT SERPL-MCNC: 0.67 MG/DL (ref 0.57–1)
DEPRECATED RDW RBC AUTO: 50.1 FL (ref 37–54)
EGFRCR SERPLBLD CKD-EPI 2021: 99.6 ML/MIN/1.73
EOSINOPHIL # BLD AUTO: 0.14 10*3/MM3 (ref 0–0.4)
EOSINOPHIL NFR BLD AUTO: 1.5 % (ref 0.3–6.2)
ERYTHROCYTE [DISTWIDTH] IN BLOOD BY AUTOMATED COUNT: 17.5 % (ref 12.3–15.4)
GLUCOSE SERPL-MCNC: 94 MG/DL (ref 65–99)
HCT VFR BLD AUTO: 36.4 % (ref 34–46.6)
HGB BLD-MCNC: 10.9 G/DL (ref 12–15.9)
IMM GRANULOCYTES # BLD AUTO: 0.02 10*3/MM3 (ref 0–0.05)
IMM GRANULOCYTES NFR BLD AUTO: 0.2 % (ref 0–0.5)
LYMPHOCYTES # BLD AUTO: 1.89 10*3/MM3 (ref 0.7–3.1)
LYMPHOCYTES NFR BLD AUTO: 20.8 % (ref 19.6–45.3)
MCH RBC QN AUTO: 23.9 PG (ref 26.6–33)
MCHC RBC AUTO-ENTMCNC: 29.9 G/DL (ref 31.5–35.7)
MCV RBC AUTO: 79.6 FL (ref 79–97)
MONOCYTES # BLD AUTO: 0.53 10*3/MM3 (ref 0.1–0.9)
MONOCYTES NFR BLD AUTO: 5.8 % (ref 5–12)
NEUTROPHILS NFR BLD AUTO: 6.44 10*3/MM3 (ref 1.7–7)
NEUTROPHILS NFR BLD AUTO: 71.1 % (ref 42.7–76)
NRBC BLD AUTO-RTO: 0 /100 WBC (ref 0–0.2)
PLATELET # BLD AUTO: 237 10*3/MM3 (ref 140–450)
PMV BLD AUTO: 10.2 FL (ref 6–12)
POTASSIUM SERPL-SCNC: 3.8 MMOL/L (ref 3.5–5.2)
RBC # BLD AUTO: 4.57 10*6/MM3 (ref 3.77–5.28)
SARS-COV-2 RNA PNL SPEC NAA+PROBE: NOT DETECTED
SODIUM SERPL-SCNC: 141 MMOL/L (ref 136–145)
WBC NRBC COR # BLD: 9.07 10*3/MM3 (ref 3.4–10.8)

## 2022-05-06 PROCEDURE — 80048 BASIC METABOLIC PNL TOTAL CA: CPT

## 2022-05-06 PROCEDURE — 36415 COLL VENOUS BLD VENIPUNCTURE: CPT

## 2022-05-06 PROCEDURE — U0004 COV-19 TEST NON-CDC HGH THRU: HCPCS | Performed by: SURGERY

## 2022-05-06 PROCEDURE — 85025 COMPLETE CBC W/AUTO DIFF WBC: CPT

## 2022-05-06 NOTE — DISCHARGE INSTRUCTIONS

## 2022-05-07 NOTE — H&P
Subjective   Trevon Jasso is a 61 y.o. female here today for pathology review.    History of Present Illness  Ms. Jasso was seen in the office today for breast evaluation.  Patient had an abnormality identified in the left breast on screening mammogram.  She then underwent diagnostic mammogram and biopsy which demonstrated findings consistent with a radial scar for which excisional biopsy was recommended.  Patient denies a palpable mass or nipple discharge.  There is no prior history of breast biopsy or cyst aspiration.  There is no family history of breast or ovarian cancer.  The patient is postmenopausal and not on hormone replacement therapy.  Imaging/biopsy  3/30/2022- bilateral screening mammogram demonstrating left breast architectural distortion  4/6/2022- diagnostic left mammogram demonstrating persistence of the architectural distortion  4/19/2022- biopsy with postbiopsy left mammogram demonstrating marking clip to be in good position  Allergies   Allergen Reactions   • Poison Ivy Extract Hives and Rash     Skin blisters     • Poison Oak Extract Hives and Rash     Skin blisters      Current Outpatient Medications   Medication Sig Dispense Refill   • levothyroxine (SYNTHROID, LEVOTHROID) 200 MCG tablet Take 200 mcg by mouth daily.     • levothyroxine (SYNTHROID, LEVOTHROID) 25 MCG tablet Take 25 mcg by mouth Every Other Day.     • montelukast (SINGULAIR) 10 MG tablet      • pantoprazole (PROTONIX) 40 MG EC tablet Take 40 mg by mouth daily.     • verapamil SR (CALAN-SR) 240 MG CR tablet Take 1 tablet by mouth Daily. 30 tablet 3     No current facility-administered medications for this visit.     Past Medical History:   Diagnosis Date   • Anxiety    • Brain tumor (HCC)     brain spur   • Brain tumor (HCC)    • C. difficile colitis 2015   • Disease of thyroid gland     s/p thyroidectomy    • Dyspnea    • GERD (gastroesophageal reflux disease)    • Hemorrhoids    • Kidney infection     treated  "recently-completed all antibiotics    • Loss, vision, sudden     episodes of vision loss-'black out vision'-reason unknown    • Migraine    • Near syncope     reason unknown    • Nerve pain    • Rash     this summer-rash of unknown origin-has used a cream prn    • Skin rash     BLE/BUE with scattered red bumps, pt states present since Spring 2016   • Swelling of ankle    • Vitamin D deficiency      Past Surgical History:   Procedure Laterality Date   •  SECTION     • CHOLECYSTECTOMY N/A 2021    Procedure: CHOLECYSTECTOMY LAPAROSCOPIC;  Surgeon: Guillaume Fortune MD;  Location: [facility] OR;  Service: General;  Laterality: N/A;   • COLONOSCOPY     • COLONOSCOPY W/ POLYPECTOMY     • ENDOSCOPY     • HYSTERECTOMY      benign   • OTHER SURGICAL HISTORY      right lateral femoral cutaneous nerve transection at the inguinal ligament    • PERONEAL NERVE DECOMPRESSION Right 2016    Procedure: RIGHT LATERAL FEMORAL CUTANEOUS NERVE TRANSECTION AT LIGUINAL LIGAMENT;  Surgeon: Ethan Tate MD;  Location: [facility] OR;  Service:    • THYROID SURGERY      removed because 'large'   • TONSILLECTOMY         Pertinent Review of Systems:  Respiratory: no shortness of breath  Cardiovascular: no chest pain  Other pertinent:      Objective   /84 (BP Location: Left arm)   Pulse 88   Ht 170.2 cm (67\")   Wt 113 kg (250 lb)   LMP  (LMP Unknown)   BMI 39.16 kg/m²   Physical Exam  Well-developed well-nourished female no acute distress  Neck:  No supraclavicular adenopathy  Lungs:  Respiratory effort normal. Auscultation: Clear, without wheezes, rhonchi, rales  Heart:  Regular rate and rhythm, without murmur, gallop, rub.  No pedal edema  Breasts: On visual inspection the breasts are symmetrical.  Examination of the right breast demonstrates no discrete mass, skin change, or axillary adenopathy.  Examination of the left breast demonstrates a small incision at the biopsy site with mild surrounding bruising in the 4 " to 5 o'clock position.  There is no palpable mass or adenopathy.     Procedures   Limited left breast Ultrasound    Indication: Evaluation for ultrasound localization    Description: With use of the 12.5 MHz transducer the area of clinical concern was scanned in multiple planes.    Findings: The left lower quadrant was scanned in multiple planes.  A hypoechoic area with what appears to be a clip is identified to the 3 cm immediately superior to the skin incision.  This corresponds to findings on post biopsy mammogram    Impression: Area of concern is amenable to ultrasound localization    Recommendation:  Followup as clinically indicated  Results/Data:  Imaging: Mammogram reports and images from 3/30/2022, 4/6/2022 and 4/19/2022 were reviewed.  I agree with the assessment  Notes:   Lab:   Other:Pathology report was reviewed.   I agree with the assessment and recommendation    Assessment/Plan   Abnormal mammogram left breast demonstrating radial scar    Proceed with left breast excisional biopsy with ultrasound localization         Discussion/Summary    Time spent:     Class 2 Severe Obesity (BMI >=35 and <=39.9). Obesity-related health conditions include the following: none. Obesity is newly identified. BMI is is above average; BMI management plan is completed. We discussed low calorie, low carb based diet program, portion control and increasing exercise.       Future Appointments   Date Time Provider Department Center   5/26/2022  9:45 AM [facility] CT 1  [facility] [place]         Please note that portions of this note were completed with a voice recognition program.    This document has been electronically signed by Tea BAUTISTA MD on May 7, 2022 12:09 EDT

## 2022-05-07 NOTE — H&P (VIEW-ONLY)
Subjective   Treovn Jasso is a 61 y.o. female here today for pathology review.    History of Present Illness  Ms. Jasso was seen in the office today for breast evaluation.  Patient had an abnormality identified in the left breast on screening mammogram.  She then underwent diagnostic mammogram and biopsy which demonstrated findings consistent with a radial scar for which excisional biopsy was recommended.  Patient denies a palpable mass or nipple discharge.  There is no prior history of breast biopsy or cyst aspiration.  There is no family history of breast or ovarian cancer.  The patient is postmenopausal and not on hormone replacement therapy.  Imaging/biopsy  3/30/2022- bilateral screening mammogram demonstrating left breast architectural distortion  4/6/2022- diagnostic left mammogram demonstrating persistence of the architectural distortion  4/19/2022- biopsy with postbiopsy left mammogram demonstrating marking clip to be in good position  Allergies   Allergen Reactions   • Poison Ivy Extract Hives and Rash     Skin blisters     • Poison Oak Extract Hives and Rash     Skin blisters      Current Outpatient Medications   Medication Sig Dispense Refill   • levothyroxine (SYNTHROID, LEVOTHROID) 200 MCG tablet Take 200 mcg by mouth daily.     • levothyroxine (SYNTHROID, LEVOTHROID) 25 MCG tablet Take 25 mcg by mouth Every Other Day.     • montelukast (SINGULAIR) 10 MG tablet      • pantoprazole (PROTONIX) 40 MG EC tablet Take 40 mg by mouth daily.     • verapamil SR (CALAN-SR) 240 MG CR tablet Take 1 tablet by mouth Daily. 30 tablet 3     No current facility-administered medications for this visit.     Past Medical History:   Diagnosis Date   • Anxiety    • Brain tumor (HCC)     brain spur   • Brain tumor (HCC)    • C. difficile colitis 2015   • Disease of thyroid gland     s/p thyroidectomy    • Dyspnea    • GERD (gastroesophageal reflux disease)    • Hemorrhoids    • Kidney infection     treated  "recently-completed all antibiotics    • Loss, vision, sudden     episodes of vision loss-'black out vision'-reason unknown    • Migraine    • Near syncope     reason unknown    • Nerve pain    • Rash     this summer-rash of unknown origin-has used a cream prn    • Skin rash     BLE/BUE with scattered red bumps, pt states present since Spring 2016   • Swelling of ankle    • Vitamin D deficiency      Past Surgical History:   Procedure Laterality Date   •  SECTION     • CHOLECYSTECTOMY N/A 2021    Procedure: CHOLECYSTECTOMY LAPAROSCOPIC;  Surgeon: Guillaume Fortune MD;  Location: [facility] OR;  Service: General;  Laterality: N/A;   • COLONOSCOPY     • COLONOSCOPY W/ POLYPECTOMY     • ENDOSCOPY     • HYSTERECTOMY      benign   • OTHER SURGICAL HISTORY      right lateral femoral cutaneous nerve transection at the inguinal ligament    • PERONEAL NERVE DECOMPRESSION Right 2016    Procedure: RIGHT LATERAL FEMORAL CUTANEOUS NERVE TRANSECTION AT LIGUINAL LIGAMENT;  Surgeon: Ethan Tate MD;  Location: [facility] OR;  Service:    • THYROID SURGERY      removed because 'large'   • TONSILLECTOMY         Pertinent Review of Systems:  Respiratory: no shortness of breath  Cardiovascular: no chest pain  Other pertinent:      Objective   /84 (BP Location: Left arm)   Pulse 88   Ht 170.2 cm (67\")   Wt 113 kg (250 lb)   LMP  (LMP Unknown)   BMI 39.16 kg/m²   Physical Exam  Well-developed well-nourished female no acute distress  Neck:  No supraclavicular adenopathy  Lungs:  Respiratory effort normal. Auscultation: Clear, without wheezes, rhonchi, rales  Heart:  Regular rate and rhythm, without murmur, gallop, rub.  No pedal edema  Breasts: On visual inspection the breasts are symmetrical.  Examination of the right breast demonstrates no discrete mass, skin change, or axillary adenopathy.  Examination of the left breast demonstrates a small incision at the biopsy site with mild surrounding bruising in the 4 " to 5 o'clock position.  There is no palpable mass or adenopathy.     Procedures   Limited left breast Ultrasound    Indication: Evaluation for ultrasound localization    Description: With use of the 12.5 MHz transducer the area of clinical concern was scanned in multiple planes.    Findings: The left lower quadrant was scanned in multiple planes.  A hypoechoic area with what appears to be a clip is identified to the 3 cm immediately superior to the skin incision.  This corresponds to findings on post biopsy mammogram    Impression: Area of concern is amenable to ultrasound localization    Recommendation:  Followup as clinically indicated  Results/Data:  Imaging: Mammogram reports and images from 3/30/2022, 4/6/2022 and 4/19/2022 were reviewed.  I agree with the assessment  Notes:   Lab:   Other:Pathology report was reviewed.   I agree with the assessment and recommendation    Assessment/Plan   Abnormal mammogram left breast demonstrating radial scar    Proceed with left breast excisional biopsy with ultrasound localization         Discussion/Summary    Time spent:     Class 2 Severe Obesity (BMI >=35 and <=39.9). Obesity-related health conditions include the following: none. Obesity is newly identified. BMI is is above average; BMI management plan is completed. We discussed low calorie, low carb based diet program, portion control and increasing exercise.       Future Appointments   Date Time Provider Department Center   5/26/2022  9:45 AM [facility] CT 1  [facility] CT [facility] [place]         Please note that portions of this note were completed with a voice recognition program.    This document has been electronically signed by Tea BAUTISTA MD on May 7, 2022 12:09 EDT

## 2022-05-09 ENCOUNTER — TELEPHONE (OUTPATIENT)
Dept: SURGERY | Facility: CLINIC | Age: 62
End: 2022-05-09

## 2022-05-10 ENCOUNTER — ANESTHESIA (OUTPATIENT)
Dept: PERIOP | Facility: HOSPITAL | Age: 62
End: 2022-05-10

## 2022-05-10 ENCOUNTER — APPOINTMENT (OUTPATIENT)
Dept: MAMMOGRAPHY | Facility: HOSPITAL | Age: 62
End: 2022-05-10

## 2022-05-10 ENCOUNTER — ANESTHESIA EVENT (OUTPATIENT)
Dept: PERIOP | Facility: HOSPITAL | Age: 62
End: 2022-05-10

## 2022-05-10 ENCOUNTER — HOSPITAL ENCOUNTER (OUTPATIENT)
Facility: HOSPITAL | Age: 62
Setting detail: HOSPITAL OUTPATIENT SURGERY
Discharge: HOME | End: 2022-05-10
Attending: SURGERY | Admitting: SURGERY

## 2022-05-10 VITALS
BODY MASS INDEX: 39.05 KG/M2 | DIASTOLIC BLOOD PRESSURE: 79 MMHG | SYSTOLIC BLOOD PRESSURE: 142 MMHG | TEMPERATURE: 97.5 F | HEART RATE: 71 BPM | RESPIRATION RATE: 18 BRPM | WEIGHT: 248.8 LBS | OXYGEN SATURATION: 98 % | HEIGHT: 67 IN

## 2022-05-10 DIAGNOSIS — R92.8 ABNORMAL MAMMOGRAM: ICD-10-CM

## 2022-05-10 PROBLEM — Z90.49 STATUS POST LAPAROSCOPIC CHOLECYSTECTOMY: Status: RESOLVED | Noted: 2021-11-16 | Resolved: 2022-05-10

## 2022-05-10 PROCEDURE — 25010000002 KETOROLAC TROMETHAMINE PER 15 MG: Performed by: NURSE ANESTHETIST, CERTIFIED REGISTERED

## 2022-05-10 PROCEDURE — 25010000002 FENTANYL CITRATE (PF) 50 MCG/ML SOLUTION: Performed by: NURSE ANESTHETIST, CERTIFIED REGISTERED

## 2022-05-10 PROCEDURE — 76098 X-RAY EXAM SURGICAL SPECIMEN: CPT

## 2022-05-10 PROCEDURE — 0 CEFAZOLIN SODIUM-DEXTROSE 2-3 GM-%(50ML) RECONSTITUTED SOLUTION: Performed by: SURGERY

## 2022-05-10 PROCEDURE — 19125 EXCISION BREAST LESION: CPT | Performed by: SURGERY

## 2022-05-10 PROCEDURE — 25010000002 MIDAZOLAM PER 1 MG: Performed by: NURSE ANESTHETIST, CERTIFIED REGISTERED

## 2022-05-10 PROCEDURE — 25010000002 PROPOFOL 10 MG/ML EMULSION: Performed by: NURSE ANESTHETIST, CERTIFIED REGISTERED

## 2022-05-10 PROCEDURE — 25010000002 ONDANSETRON PER 1 MG: Performed by: NURSE ANESTHETIST, CERTIFIED REGISTERED

## 2022-05-10 RX ORDER — SODIUM CHLORIDE, SODIUM LACTATE, POTASSIUM CHLORIDE, CALCIUM CHLORIDE 600; 310; 30; 20 MG/100ML; MG/100ML; MG/100ML; MG/100ML
125 INJECTION, SOLUTION INTRAVENOUS ONCE
Status: COMPLETED | OUTPATIENT
Start: 2022-05-10 | End: 2022-05-10

## 2022-05-10 RX ORDER — SODIUM CHLORIDE 0.9 % (FLUSH) 0.9 %
10 SYRINGE (ML) INJECTION AS NEEDED
Status: DISCONTINUED | OUTPATIENT
Start: 2022-05-10 | End: 2022-05-10 | Stop reason: HOSPADM

## 2022-05-10 RX ORDER — BUPIVACAINE HYDROCHLORIDE AND EPINEPHRINE 5; 5 MG/ML; UG/ML
INJECTION, SOLUTION EPIDURAL; INTRACAUDAL; PERINEURAL AS NEEDED
Status: DISCONTINUED | OUTPATIENT
Start: 2022-05-10 | End: 2022-05-10 | Stop reason: HOSPADM

## 2022-05-10 RX ORDER — PROPOFOL 10 MG/ML
VIAL (ML) INTRAVENOUS AS NEEDED
Status: DISCONTINUED | OUTPATIENT
Start: 2022-05-10 | End: 2022-05-10 | Stop reason: SURG

## 2022-05-10 RX ORDER — ONDANSETRON 2 MG/ML
4 INJECTION INTRAMUSCULAR; INTRAVENOUS AS NEEDED
Status: DISCONTINUED | OUTPATIENT
Start: 2022-05-10 | End: 2022-05-10 | Stop reason: HOSPADM

## 2022-05-10 RX ORDER — FENTANYL CITRATE 50 UG/ML
50 INJECTION, SOLUTION INTRAMUSCULAR; INTRAVENOUS
Status: DISCONTINUED | OUTPATIENT
Start: 2022-05-10 | End: 2022-05-10 | Stop reason: HOSPADM

## 2022-05-10 RX ORDER — ELUXADOLINE 100 MG/1
1 TABLET, FILM COATED ORAL DAILY
COMMUNITY
End: 2023-02-24

## 2022-05-10 RX ORDER — FENTANYL CITRATE 50 UG/ML
INJECTION, SOLUTION INTRAMUSCULAR; INTRAVENOUS AS NEEDED
Status: DISCONTINUED | OUTPATIENT
Start: 2022-05-10 | End: 2022-05-10 | Stop reason: SURG

## 2022-05-10 RX ORDER — SODIUM CHLORIDE 0.9 % (FLUSH) 0.9 %
10 SYRINGE (ML) INJECTION EVERY 12 HOURS SCHEDULED
Status: DISCONTINUED | OUTPATIENT
Start: 2022-05-10 | End: 2022-05-10 | Stop reason: HOSPADM

## 2022-05-10 RX ORDER — MEPERIDINE HYDROCHLORIDE 25 MG/ML
12.5 INJECTION INTRAMUSCULAR; INTRAVENOUS; SUBCUTANEOUS
Status: DISCONTINUED | OUTPATIENT
Start: 2022-05-10 | End: 2022-05-10 | Stop reason: HOSPADM

## 2022-05-10 RX ORDER — OXYCODONE HYDROCHLORIDE AND ACETAMINOPHEN 5; 325 MG/1; MG/1
1 TABLET ORAL ONCE AS NEEDED
Status: DISCONTINUED | OUTPATIENT
Start: 2022-05-10 | End: 2022-05-10 | Stop reason: HOSPADM

## 2022-05-10 RX ORDER — KETOROLAC TROMETHAMINE 30 MG/ML
INJECTION, SOLUTION INTRAMUSCULAR; INTRAVENOUS AS NEEDED
Status: DISCONTINUED | OUTPATIENT
Start: 2022-05-10 | End: 2022-05-10 | Stop reason: SURG

## 2022-05-10 RX ORDER — ONDANSETRON 2 MG/ML
INJECTION INTRAMUSCULAR; INTRAVENOUS AS NEEDED
Status: DISCONTINUED | OUTPATIENT
Start: 2022-05-10 | End: 2022-05-10 | Stop reason: SURG

## 2022-05-10 RX ORDER — IPRATROPIUM BROMIDE AND ALBUTEROL SULFATE 2.5; .5 MG/3ML; MG/3ML
3 SOLUTION RESPIRATORY (INHALATION) ONCE AS NEEDED
Status: DISCONTINUED | OUTPATIENT
Start: 2022-05-10 | End: 2022-05-10 | Stop reason: HOSPADM

## 2022-05-10 RX ORDER — MIDAZOLAM HYDROCHLORIDE 1 MG/ML
INJECTION INTRAMUSCULAR; INTRAVENOUS AS NEEDED
Status: DISCONTINUED | OUTPATIENT
Start: 2022-05-10 | End: 2022-05-10 | Stop reason: SURG

## 2022-05-10 RX ORDER — CEFAZOLIN SODIUM 2 G/50ML
2 SOLUTION INTRAVENOUS ONCE
Status: COMPLETED | OUTPATIENT
Start: 2022-05-10 | End: 2022-05-10

## 2022-05-10 RX ORDER — MAGNESIUM HYDROXIDE 1200 MG/15ML
LIQUID ORAL AS NEEDED
Status: DISCONTINUED | OUTPATIENT
Start: 2022-05-10 | End: 2022-05-10 | Stop reason: HOSPADM

## 2022-05-10 RX ORDER — SODIUM CHLORIDE, SODIUM LACTATE, POTASSIUM CHLORIDE, CALCIUM CHLORIDE 600; 310; 30; 20 MG/100ML; MG/100ML; MG/100ML; MG/100ML
100 INJECTION, SOLUTION INTRAVENOUS ONCE AS NEEDED
Status: DISCONTINUED | OUTPATIENT
Start: 2022-05-10 | End: 2022-05-10 | Stop reason: HOSPADM

## 2022-05-10 RX ORDER — FAMOTIDINE 10 MG/ML
INJECTION, SOLUTION INTRAVENOUS AS NEEDED
Status: DISCONTINUED | OUTPATIENT
Start: 2022-05-10 | End: 2022-05-10 | Stop reason: SURG

## 2022-05-10 RX ORDER — HYDROCODONE BITARTRATE AND ACETAMINOPHEN 7.5; 325 MG/1; MG/1
1 TABLET ORAL 4 TIMES DAILY PRN
Qty: 12 TABLET | Refills: 0 | Status: ON HOLD | OUTPATIENT
Start: 2022-05-10 | End: 2022-11-03

## 2022-05-10 RX ORDER — LIDOCAINE HYDROCHLORIDE 20 MG/ML
INJECTION, SOLUTION INFILTRATION; PERINEURAL AS NEEDED
Status: DISCONTINUED | OUTPATIENT
Start: 2022-05-10 | End: 2022-05-10 | Stop reason: SURG

## 2022-05-10 RX ORDER — MIDAZOLAM HYDROCHLORIDE 1 MG/ML
1 INJECTION INTRAMUSCULAR; INTRAVENOUS
Status: DISCONTINUED | OUTPATIENT
Start: 2022-05-10 | End: 2022-05-10 | Stop reason: HOSPADM

## 2022-05-10 RX ADMIN — FENTANYL CITRATE 50 MCG: 50 INJECTION INTRAMUSCULAR; INTRAVENOUS at 10:30

## 2022-05-10 RX ADMIN — ONDANSETRON 4 MG: 2 INJECTION INTRAMUSCULAR; INTRAVENOUS at 10:01

## 2022-05-10 RX ADMIN — CEFAZOLIN SODIUM 2 G: 2 SOLUTION INTRAVENOUS at 09:55

## 2022-05-10 RX ADMIN — FENTANYL CITRATE 100 MCG: 50 INJECTION INTRAMUSCULAR; INTRAVENOUS at 10:01

## 2022-05-10 RX ADMIN — LIDOCAINE HYDROCHLORIDE 40 MG: 20 INJECTION, SOLUTION INFILTRATION; PERINEURAL at 10:01

## 2022-05-10 RX ADMIN — MIDAZOLAM 2 MG: 1 INJECTION INTRAMUSCULAR; INTRAVENOUS at 09:55

## 2022-05-10 RX ADMIN — KETOROLAC TROMETHAMINE 30 MG: 30 INJECTION, SOLUTION INTRAMUSCULAR at 10:30

## 2022-05-10 RX ADMIN — FAMOTIDINE 20 MG: 10 INJECTION INTRAVENOUS at 09:55

## 2022-05-10 RX ADMIN — PROPOFOL 200 MG: 10 INJECTION, EMULSION INTRAVENOUS at 10:01

## 2022-05-10 RX ADMIN — SODIUM CHLORIDE, POTASSIUM CHLORIDE, SODIUM LACTATE AND CALCIUM CHLORIDE: 600; 310; 30; 20 INJECTION, SOLUTION INTRAVENOUS at 09:55

## 2022-05-10 RX ADMIN — FENTANYL CITRATE 50 MCG: 50 INJECTION INTRAMUSCULAR; INTRAVENOUS at 10:17

## 2022-05-10 NOTE — OP NOTE
Left breast biopsy with ultrasound localization and specimen x-ray    Pre-op: Radial scar left breast on core biopsy    Post-op:  Same    Surgeon:  Tea Mack M.D., F.A.C.S.    Assistant: Tanner    Anesthesia:  general      Indications: Radial scar left breast on core biopsy       Procedure Details   After obtaining informed consent and receiving preoperative antibiotics and with venous compression boots in place, the patient was taken to the operating room and placed under anesthesia.  The left breast was prepped and draped in sterile fashion.  Ultrasound was utilized to identify the marking biopsy clip 3 cm superior to the skin incision.  A radial incision was made directly over this area and carried down to the breast tissue.  Dissection was then carried out around this area.  And the area of concern was removed.  Marking stitch was placed at 10:00.  Specimen x-ray was obtained which demonstrated the clip to be present within the specimen.  The wound was irrigated and hemostasis was obtained.  Following this the incision was closed with deep interrupted #1 Vicryl sutures, followed by 3-0 Vicryl subdermal sutures and 4-0 Monocryl.  Dressing was placed.  Half percent Marcaine with epinephrine was placed for analgesia.  Patient tolerated the procedure well was taken to the recovery room in stable condition    Findings:    Estimated Blood Loss:  Minimal    Blood Administered: none           Drains: none           Specimens:   ID Type Source Tests Collected by Time   A :  Tissue Breast, Left TISSUE EXAM, [facility] ([facility]) Tea Mack MD 5/10/2022 1024        Grafts and Implants: No       Complications:  none           Disposition: PACU - hemodynamically stable.           Condition: stable       Call bell

## 2022-05-10 NOTE — ANESTHESIA POSTPROCEDURE EVALUATION
Patient: Trevon Jasso    Procedure Summary     Date: 05/10/22 Room / Location: [facility] OR 01 /  [facility] OR    Anesthesia Start: 0955 Anesthesia Stop: 1045    Procedure: BREAST BIOPSY (Left Breast) Diagnosis:       Abnormal mammogram      (Abnormal mammogram [R92.8])    Surgeons: Tea Mack MD Provider: James Anton MD    Anesthesia Type: general ASA Status: 3          Anesthesia Type: general    Vitals  Vitals Value Taken Time   /75 05/10/22 1101   Temp 97 °F (36.1 °C) 05/10/22 1046   Pulse 77 05/10/22 1101   Resp 14 05/10/22 1101   SpO2 97 % 05/10/22 1101           Post Anesthesia Care and Evaluation    Patient location during evaluation: PACU  Patient participation: complete - patient participated  Level of consciousness: awake  Pain score: 0  Pain management: adequate  Airway patency: patent  Anesthetic complications: No anesthetic complications  PONV Status: none  Cardiovascular status: acceptable  Respiratory status: acceptable  Hydration status: acceptable

## 2022-05-10 NOTE — ANESTHESIA PREPROCEDURE EVALUATION
Anesthesia Evaluation     Patient summary reviewed and Nursing notes reviewed   no history of anesthetic complications:  NPO Solid Status: > 8 hours  NPO Liquid Status: > 8 hours           Airway   Mallampati: II  TM distance: <3 FB  Neck ROM: full  Large neck circumference  Dental - normal exam   (+) poor dentition    Comment: Multiple missing, none loose    Pulmonary - normal exam   (+) shortness of breath,   Cardiovascular - normal exam    ECG reviewed  Rhythm: regular    (+) hypertension,       Neuro/Psych  (+) headaches, numbness, psychiatric history Anxiety,      ROS Comment: History of Brain Tumor    FINDINGS:       Brain volume is within normal limits.  Ventricles are normal in size and  configuration. Mesial temporal lobe structures are reasonably  symmetrical in size and signal. There is no evidence of cortical  dysplasia or gray matter heterotopia.  There is no significant abnormal  signal on FLAIR or diffusion weighted images. There is no abnormal  parenchymal or extra-axial enhancement. There is no mass effect and the  basal cisterns are patent. [place] flow voids are maintained.   No significant abnormal sinonasal or temporal bone fluid is identified.     IMPRESSION:  Normal brain MRI.   GI/Hepatic/Renal/Endo    (+) obesity, morbid obesity, GERD, GI bleeding , liver disease history of elevated LFT, renal disease, thyroid problem hypothyroidism    Musculoskeletal     (+) radiculopathy  Abdominal   (+) obese,     Bowel sounds: normal.   Substance History - negative use     OB/GYN negative ob/gyn ROS         Other   blood dyscrasia anemia,     (-) history of cancer                    Anesthesia Plan    ASA 3     general     intravenous induction     Anesthetic plan, all risks, benefits, and alternatives have been provided, discussed and informed consent has been obtained with: patient.  Use of blood products discussed with patient  Consented to blood products.       Lab Results   Component Value  Date    WBC 9.07 05/06/2022    HGB 10.9 (L) 05/06/2022    HCT 36.4 05/06/2022    MCV 79.6 05/06/2022     05/06/2022         Lab Results   Component Value Date    GLUCOSE 94 05/06/2022    BUN 9 05/06/2022    CREATININE 0.67 05/06/2022    EGFRIFNONA 76 11/17/2021    BCR 13.4 05/06/2022    K 3.8 05/06/2022    CO2 25.8 05/06/2022    CALCIUM 9.3 05/06/2022    ALBUMIN 4.80 10/30/2018    LABIL2 1.6 05/28/2016    AST 31 (H) 10/30/2018    ALT 40 (H) 10/30/2018

## 2022-05-10 NOTE — ANESTHESIA PROCEDURE NOTES
Airway  Urgency: elective    Date/Time: 5/10/2022 10:01 AM    General Information and Staff    Patient location during procedure: OR  CRNA/CAA: Deisy Xiong CRNA    Indications and Patient Condition    Preoxygenated: yes  Mask difficulty assessment: 1 - vent by mask    Final Airway Details  Final airway type: supraglottic airway      Successful airway: unique  Size 4    Number of attempts at approach: 1  Assessment: lips, teeth, and gum same as pre-op and atraumatic intubation

## 2022-05-13 LAB — REF LAB TEST METHOD: NORMAL

## 2022-05-20 ENCOUNTER — OFFICE VISIT (OUTPATIENT)
Dept: SURGERY | Facility: CLINIC | Age: 62
End: 2022-05-20

## 2022-05-20 VITALS
WEIGHT: 251.6 LBS | BODY MASS INDEX: 39.49 KG/M2 | SYSTOLIC BLOOD PRESSURE: 124 MMHG | HEIGHT: 67 IN | HEART RATE: 90 BPM | DIASTOLIC BLOOD PRESSURE: 76 MMHG

## 2022-05-20 DIAGNOSIS — Z09 POSTOP CHECK: ICD-10-CM

## 2022-05-20 DIAGNOSIS — R92.8 ABNORMAL MAMMOGRAM: Primary | ICD-10-CM

## 2022-05-20 PROCEDURE — 99024 POSTOP FOLLOW-UP VISIT: CPT | Performed by: SURGERY

## 2022-05-20 NOTE — PROGRESS NOTES
"Subjective   Trevon Jasso is a 61 y.o. female here today for post op.    History of Present Illness  Ms. Jasso was seen in the office today for her first post op visit following a left breast biopsy on 5/10/2022.  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  Patient voices no complaints..  Allergies   Allergen Reactions   • Poison Ivy Extract Hives and Rash     Skin blisters     • Poison Oak Extract Hives and Rash     Skin blisters          Current Outpatient Medications   Medication Sig Dispense Refill   • Eluxadoline (Viberzi) 100 MG tablet Take 1 tablet by mouth Daily.     • levothyroxine (SYNTHROID, LEVOTHROID) 200 MCG tablet Take 200 mcg by mouth daily.     • levothyroxine (SYNTHROID, LEVOTHROID) 25 MCG tablet Take 25 mcg by mouth Every Other Day.     • montelukast (SINGULAIR) 10 MG tablet      • pantoprazole (PROTONIX) 40 MG EC tablet Take 40 mg by mouth daily.     • verapamil SR (CALAN-SR) 240 MG CR tablet Take 1 tablet by mouth Daily. 30 tablet 3   • HYDROcodone-acetaminophen (Norco) 7.5-325 MG per tablet Take 1 tablet by mouth 4 (Four) Times a Day As Needed for Moderate Pain . 12 tablet 0     No current facility-administered medications for this visit.       Objective   /76 (BP Location: Left arm)   Pulse 90   Ht 170.2 cm (67\")   Wt 114 kg (251 lb 9.6 oz)   LMP  (LMP Unknown)   BMI 39.41 kg/m²    Physical Exam  Left wrist: Healing surgical scar in the lateral 2:30 position, with expected postop change.  Results/Data  Pathology result was reviewed and discussed with the patient    Procedures     Assessment & Plan   Stable course, status post excision of a left breast papilloma    Patient advised to follow breast center recommendations regarding follow-up imaging.  Follow up with me as needed       Discussion/Summary  Class 2 Severe Obesity (BMI >=35 and <=39.9). Obesity-related health conditions include the following: hypertension. Obesity is improving with lifestyle " modifications. BMI is is above average; BMI management plan is completed. We discussed portion control and increasing exercise.       Future Appointments   Date Time Provider Department Center   5/20/2022  9:20 AM Tea Mack MD [facility]   5/26/2022  9:45 AM [facility] CT 1  [facility] CT [facility]         Please note that portions of this note were completed with a voice recognition program.

## 2022-05-26 ENCOUNTER — HOSPITAL ENCOUNTER (OUTPATIENT)
Dept: CT IMAGING | Facility: HOSPITAL | Age: 62
Discharge: HOME | End: 2022-05-26
Admitting: FAMILY MEDICINE

## 2022-05-26 DIAGNOSIS — M54.2 CERVICALGIA: ICD-10-CM

## 2022-05-26 PROCEDURE — 72125 CT NECK SPINE W/O DYE: CPT | Performed by: RADIOLOGY

## 2022-05-26 PROCEDURE — 72125 CT NECK SPINE W/O DYE: CPT

## 2022-08-28 ENCOUNTER — APPOINTMENT (OUTPATIENT)
Dept: GENERAL RADIOLOGY | Facility: HOSPITAL | Age: 62
End: 2022-08-28

## 2022-08-28 ENCOUNTER — HOSPITAL ENCOUNTER (EMERGENCY)
Facility: HOSPITAL | Age: 62
Discharge: HOME | End: 2022-08-28
Attending: STUDENT IN AN ORGANIZED HEALTH CARE EDUCATION/TRAINING PROGRAM | Admitting: STUDENT IN AN ORGANIZED HEALTH CARE EDUCATION/TRAINING PROGRAM

## 2022-08-28 VITALS
BODY MASS INDEX: 39.24 KG/M2 | HEIGHT: 67 IN | TEMPERATURE: 97 F | OXYGEN SATURATION: 98 % | SYSTOLIC BLOOD PRESSURE: 142 MMHG | RESPIRATION RATE: 20 BRPM | WEIGHT: 250 LBS | DIASTOLIC BLOOD PRESSURE: 84 MMHG | HEART RATE: 88 BPM

## 2022-08-28 DIAGNOSIS — J40 BRONCHITIS: Primary | ICD-10-CM

## 2022-08-28 PROCEDURE — 71045 X-RAY EXAM CHEST 1 VIEW: CPT

## 2022-08-28 PROCEDURE — 99283 EMERGENCY DEPT VISIT LOW MDM: CPT

## 2022-08-28 RX ORDER — GUAIFENESIN 200 MG/10ML
200 LIQUID ORAL EVERY 4 HOURS PRN
Qty: 100 ML | Refills: 0 | Status: SHIPPED | OUTPATIENT
Start: 2022-08-28 | End: 2022-09-04

## 2022-08-28 RX ORDER — GUAIFENESIN/DEXTROMETHORPHAN 100-10MG/5
5 SYRUP ORAL ONCE
Status: COMPLETED | OUTPATIENT
Start: 2022-08-28 | End: 2022-08-28

## 2022-08-28 RX ADMIN — GUAIFENESIN AND DEXTROMETHORPHAN 5 ML: 100; 10 SYRUP ORAL at 19:40

## 2022-09-29 ENCOUNTER — OFFICE VISIT (OUTPATIENT)
Dept: GASTROENTEROLOGY | Facility: CLINIC | Age: 62
End: 2022-09-29

## 2022-09-29 VITALS
SYSTOLIC BLOOD PRESSURE: 165 MMHG | WEIGHT: 246 LBS | HEART RATE: 95 BPM | OXYGEN SATURATION: 98 % | BODY MASS INDEX: 38.61 KG/M2 | HEIGHT: 67 IN | DIASTOLIC BLOOD PRESSURE: 83 MMHG

## 2022-09-29 DIAGNOSIS — K62.89 ANAL OR RECTAL PAIN: Primary | ICD-10-CM

## 2022-09-29 DIAGNOSIS — R19.7 DIARRHEA, UNSPECIFIED TYPE: ICD-10-CM

## 2022-09-29 PROCEDURE — 99204 OFFICE O/P NEW MOD 45 MIN: CPT | Performed by: INTERNAL MEDICINE

## 2022-09-29 RX ORDER — MONTELUKAST SODIUM 4 MG/1
1 TABLET, CHEWABLE ORAL DAILY
Qty: 30 TABLET | Refills: 2 | Status: SHIPPED | OUTPATIENT
Start: 2022-09-29 | End: 2023-01-03 | Stop reason: SDUPTHER

## 2022-09-29 RX ORDER — HYDROCORTISONE ACETATE 25 MG/1
25 SUPPOSITORY RECTAL 2 TIMES DAILY PRN
Qty: 30 SUPPOSITORY | Refills: 5 | Status: SHIPPED | OUTPATIENT
Start: 2022-09-29

## 2022-09-29 RX ORDER — SODIUM, POTASSIUM,MAG SULFATES 17.5-3.13G
2 SOLUTION, RECONSTITUTED, ORAL ORAL EVERY 12 HOURS
Qty: 708 ML | Refills: 0 | Status: SHIPPED | OUTPATIENT
Start: 2022-09-29 | End: 2022-09-30

## 2022-09-29 NOTE — PROGRESS NOTES
Subjective     Trevon Jasso is a 62 y.o. female who presents to the office today as a consultation from Wily Cobb MD for evaluation of Rectal Bleeding        History of Present Illness:  The patient presents for evaluation of BRBPR and diarrhea.  She states since she had her gallbladder out due to epigastric pain.  As her gallbladder was removed she states epigastric pain has resolved.  However, she is now having diarrhea.  She states after eating she will have to run to the bathroom to have a bowel movement. She has 5 to 6 bm daily.  Sometimes she can have 5 to 6 in the morning.   She describes her stool as a Laurens score 7. There is no associated weight loss. She is experiencing rectal pain described as burning.  She was told to eat fiber but noted this is causing more diarrhea. She denies abdominal pain. 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  She had this about 3 years ago and since then has tested positive for C. difficile.  She has been on antibiotic recently for bronchitis.  However, she states this does not seem like c. Diff to her because she recalls the terrible smell related with c. Difficle.  Her last colonoscopy was 2 years per her report.  She has had precancerous polyps.  She has a colonoscopy about every 2 years due to polyps.      Review of Systems:  Review of Systems   Constitutional: Negative.    HENT: Negative.    Eyes: Negative.    Respiratory: Negative.    Cardiovascular: Negative.    Gastrointestinal: Positive for abdominal distention, abdominal pain, anal bleeding, diarrhea and rectal pain. Negative for blood in stool, constipation, nausea and vomiting.   Endocrine: Negative.    Genitourinary: Negative.    Musculoskeletal: Negative.    Skin: Negative.    Allergic/Immunologic: Negative.    Neurological: Negative.    Hematological: Negative.    Psychiatric/Behavioral: Negative.        Past Medical History:  Past Medical History:   Diagnosis Date   • Anxiety    • Brain tumor  (HCC)     brain spur   • Brain tumor (HCC)    • C. difficile colitis    • Carpal tunnel syndrome on both sides    • Disease of thyroid gland     s/p thyroidectomy    • Dyspnea    • GERD (gastroesophageal reflux disease)    • Hemorrhoids    • IBS (irritable colon syndrome)     IBS with diarrhea   • Kidney infection     treated recently-completed all antibiotics    • Loss, vision, sudden     episodes of vision loss-'black out vision'-reason unknown    • Memory problem    • Migraine    • Near syncope     reason unknown    • Nerve pain    • Rash     this summer-rash of unknown origin-has used a cream prn    • Skin rash     BLE/BUE with scattered red bumps, pt states present since Spring 2016   • Swelling of ankle    • Vitamin D deficiency        Past Surgical History:  Past Surgical History:   Procedure Laterality Date   • ABDOMINAL SURGERY     • BREAST BIOPSY Left 5/10/2022    Procedure: BREAST BIOPSY;  Surgeon: Tea Mack MD;  Location: [facility];  Service: General;  Laterality: Left;   •  SECTION     • CHOLECYSTECTOMY N/A 2021    Procedure: CHOLECYSTECTOMY LAPAROSCOPIC;  Surgeon: Guillaume Fortune MD;  Location: [facility];  Service: General;  Laterality: N/A;   • COLONOSCOPY     • COLONOSCOPY W/ POLYPECTOMY     • ENDOSCOPY     • HYSTERECTOMY      benign   • OTHER SURGICAL HISTORY      right lateral femoral cutaneous nerve transection at the inguinal ligament    • PERONEAL NERVE DECOMPRESSION Right 2016    Procedure: RIGHT LATERAL FEMORAL CUTANEOUS NERVE TRANSECTION AT LIGUINAL LIGAMENT;  Surgeon: Ethan Tate MD;  Location: [facility];  Service:    • THYROID SURGERY      removed because 'large'   • TONSILLECTOMY         Family History:  Family History   Problem Relation Age of Onset   • Diabetes Mother    • Heart disease Mother    • Diabetes Father    • Heart disease Father    • Breast cancer Neg Hx        Social History:  Social History     Socioeconomic History   • Marital  "status:    Tobacco Use   • Smoking status: Never Smoker   • Smokeless tobacco: Never Used   Vaping Use   • Vaping Use: Never used   Substance and Sexual Activity   • Alcohol use: No   • Drug use: No   • Sexual activity: Defer     Partners: Male     Birth control/protection: Surgical       Current Medication List:    Current Outpatient Medications:   •  Eluxadoline (Viberzi) 100 MG tablet, Take 1 tablet by mouth Daily., Disp: , Rfl:   •  HYDROcodone-acetaminophen (Norco) 7.5-325 MG per tablet, Take 1 tablet by mouth 4 (Four) Times a Day As Needed for Moderate Pain ., Disp: 12 tablet, Rfl: 0  •  levothyroxine (SYNTHROID, LEVOTHROID) 200 MCG tablet, Take 200 mcg by mouth daily., Disp: , Rfl:   •  levothyroxine (SYNTHROID, LEVOTHROID) 25 MCG tablet, Take 25 mcg by mouth Every Other Day., Disp: , Rfl:   •  montelukast (SINGULAIR) 10 MG tablet, , Disp: , Rfl:   •  pantoprazole (PROTONIX) 40 MG EC tablet, Take 40 mg by mouth daily., Disp: , Rfl:   •  verapamil SR (CALAN-SR) 240 MG CR tablet, Take 1 tablet by mouth Daily., Disp: 30 tablet, Rfl: 3  •  colestipol (COLESTID) 1 g tablet, Take 1 tablet by mouth Daily., Disp: 30 tablet, Rfl: 2  •  hydrocortisone (ANUSOL-HC) 25 MG suppository, Insert 1 suppository into the rectum 2 (Two) Times a Day As Needed for Hemorrhoids (rectal discomfort)., Disp: 30 suppository, Rfl: 5  •  sodium-potassium-magnesium sulfates (Suprep Bowel Prep Kit) 17.5-3.13-1.6 GM/177ML solution oral solution, Take 2 bottles by mouth Every 12 (Twelve) Hours for 2 doses., Disp: 708 mL, Rfl: 0    Allergies:   Poison ivy extract and Poison oak extract    Vitals:  /83   Pulse 95   Ht 170.2 cm (67\")   Wt 112 kg (246 lb)   LMP  (LMP Unknown)   SpO2 98%   BMI 38.53 kg/m²     Physical Exam:  Physical Exam  Constitutional:       Appearance: She is obese.   HENT:      Head: Normocephalic and atraumatic.      Nose: Nose normal. No congestion or rhinorrhea.   Eyes:      General: No scleral icterus.   "   Extraocular Movements: Extraocular movements intact.      Conjunctiva/sclera: Conjunctivae normal.      Pupils: Pupils are equal, round, and reactive to light.   Cardiovascular:      Rate and Rhythm: Normal rate and regular rhythm.      Pulses: Normal pulses.      Heart sounds: Normal heart sounds.   Pulmonary:      Effort: Pulmonary effort is normal.      Breath sounds: Normal breath sounds.   Abdominal:      General: Abdomen is flat. Bowel sounds are normal. There is no distension.      Palpations: Abdomen is soft. There is no shifting dullness, fluid wave, hepatomegaly, splenomegaly, mass or pulsatile mass.      Tenderness: There is no abdominal tenderness. There is no guarding or rebound.      Hernia: No hernia is present.   Musculoskeletal:         General: No swelling or tenderness.      Cervical back: Normal range of motion and neck supple.   Skin:     General: Skin is warm and dry.      Coloration: Skin is not jaundiced.   Neurological:      General: No focal deficit present.      Mental Status: She is alert and oriented to person, place, and time.   Psychiatric:         Mood and Affect: Mood normal.         Behavior: Behavior normal.         Results Review:  Lab Results:   No visits with results within 1 Month(s) from this visit.   Latest known visit with results is:   Admission on 05/10/2022, Discharged on 05/10/2022   Component Date Value Ref Range Status   • Reference Lab Report 05/10/2022    Final                    Value:[facility]  [address] [place] [address]  Phone: [phone] or [phone]  Fax: [phone]  Ravinder Roth M.D., Medical Director    PATIENT NAME                           LABORATORY [id number]  [address]                      [id number]  [id number]                         AGE              SEX  N           CLIENT REF #  [facility]              61      1960  F    [id number]   [id number]    [address]          "            REQUESTING M.D.     ATTENDING M.D.     COPY TO.  ABIGAIL FLOERS [id number]                   VIRIDIANA BAUTISTA  DATE COLLECTED      DATE RECEIVED      DATE REPORTED  05/10/2022          05/10/2022         05/13/2022    DIAGNOSIS:  BREAST, LUMPECTOMY, LEFT:  Benign breast parenchyma with intraductal papillomas, usual ductal hyperplasia,  and stromal fibrosis at prior biopsy site change  Negative for atypical ductal hyperplasia, DCIS, or invasive carcinoma  All margins benign    SANDEEP    COMMENT:  To confirm                           the diagnosis, immunohistochemical stains with good  controls were performed.  All areas of the ductal proliferation show intact  myoepithelial cells by calponin and P 40 stains.  In addition, the ductal  hyperplasia shows positive CK5/6, and mixed pattern ER staining, consistent  with usual ductal hyperplasia.    CLINICAL HISTORY:  Abnormal mammogram    SPECIMENS RECEIVED:  BREAST, LUMPECTOMY, LEFT    MICROSCOPIC DESCRIPTION:  Tissue blocks are prepared and slides are examined microscopically on all  specimens. See diagnosis for details.    The internal and external (both positive and negative) controls reacted  appropriately. Some of our immunohistochemical and in situ hybridization  studies are performed as analyte specific reagents. The following statement  applies to those tests: This test was developed, and its performance  characteristics determined by [facility]. It has not been  cleared or approved by the [place] Food and Drug Administration. However, the  FDA has                           determined that approval and clearance are not necessary.    Professional interpretation rendered by Kiran Johnson M.D., F.C.A.P. at [facility], [address] [place] [address].    GROSS DESCRIPTION:  The specimen is received in 1 formalin filled container labeled as \"breast, left\"  and consists of a 20 g oriented, disrupted lumpectomy specimen which is 5.6 cm  from " anterior to posterior, 3.4 cm from medial to lateral, and 2.3 cm from  superior to inferior.  The specimen is received previously inked as follows:  Anterior = green, posterior = black, medial = yellow, lateral = orange, superior =  red, and inferior = blue.  The specimen is serially sectioned from anterior to  posterior into 10 slices of 0.4-0.5 cm each, except for the anterior and posterior  ends which are 1.3 cm each.  A 1.4 x 0.7 x 0.6 cm biopsy cavity is identified in  slices 4-7.  The biopsy cavity contains a small amount of hemorrhagic, tan-red,  gel like material and an open coil clip.  The cavity is 0.8                           cm from the superior  margin, 0.7 cm from the inferior margin, 0.7 cm from the medial margin, 1.6 cm  from the lateral margin, 2.5 cm from the anterior margin, and 2.3 cm from the  posterior margin.  Surrounding the biopsy cavity is a small amount of tan-pink  fibrous tissue and focal areas of chalky white possible fat necrosis.  The  remaining cut surfaces consists of yellow lobulated adipose tissue without  additional lesions.  Representative sections are submitted from anterior to  posterior as follows:  A1: Anterior margin, representative and perpendicular, slice 1  A2: Biopsy cavity, central slice 4  A3: Biopsy cavity, central slice 5  A4-A5: Slice 6, bisected to include biopsy cavity with closest superior, inferior,  medial, and lateral margins  A6: Biopsy cavity, central slice 7  A7: Fibrous tissue posterior to biopsy cavity, medial slice 8  A8: Posterior margin, representative and perpendicular, slice 10.  MF    Out of body: 1024 on 5/10/2022  Placed in formalin: 1029 on                           5/10/2022 (cold ischemic time = 5 minutes, time in  formalin = approximately 27 hours)    REVIEWED, DIAGNOSED AND ELECTRONICALLY  SIGNED BY:    Kiran Johnson M.D., F.C.A.P.  CPT CODES:  23650, 88341x3, 25411         Assessment & Plan     Visit Diagnoses:    ICD-10-CM ICD-9-CM   1.  Anal or rectal pain  K62.89 569.42   2. Diarrhea, unspecified type  R19.7 787.91       Plan:  The patient will undergo colonoscopy to evaluate for possible microscopic colitis as a source of her diarrhea.  I am going to prescribe her Colestid to see if she responds to this.  This could be a bile acid diarrhea.  She will also be prescribed hydrocortisone suppositories for possible hemorrhoidal irritation as a source of her rectal pain.  I will also evaluate this at the time of her colonoscopy.  She will follow-up after her procedure.    COLONOSCOPY (N/A)      MEDS ORDERED DURING VISIT:  New Medications Ordered This Visit   Medications   • colestipol (COLESTID) 1 g tablet     Sig: Take 1 tablet by mouth Daily.     Dispense:  30 tablet     Refill:  2   • sodium-potassium-magnesium sulfates (Suprep Bowel Prep Kit) 17.5-3.13-1.6 GM/177ML solution oral solution     Sig: Take 2 bottles by mouth Every 12 (Twelve) Hours for 2 doses.     Dispense:  708 mL     Refill:  0   • hydrocortisone (ANUSOL-HC) 25 MG suppository     Sig: Insert 1 suppository into the rectum 2 (Two) Times a Day As Needed for Hemorrhoids (rectal discomfort).     Dispense:  30 suppository     Refill:  5       Follow-up after colonoscopy.             This document has been electronically signed by Ines Yañez MD   September 29, 2022 20:15 EDT        Part of this note may be an electronic transcription/translation of spoken language to printed text using the Dragon Dictation System.

## 2022-10-04 ENCOUNTER — TELEPHONE (OUTPATIENT)
Dept: UROLOGY | Facility: CLINIC | Age: 62
End: 2022-10-04

## 2022-10-25 PROBLEM — K62.89 ANAL OR RECTAL PAIN: Status: ACTIVE | Noted: 2022-10-25

## 2022-10-31 ENCOUNTER — TELEPHONE (OUTPATIENT)
Dept: GASTROENTEROLOGY | Facility: CLINIC | Age: 62
End: 2022-10-31

## 2022-10-31 NOTE — TELEPHONE ENCOUNTER
Patients spouse called stating that there is no prep called into pharmacy for her colonoscopy on November 03,2022

## 2022-11-03 ENCOUNTER — ANESTHESIA (OUTPATIENT)
Dept: PERIOP | Facility: HOSPITAL | Age: 62
End: 2022-11-03

## 2022-11-03 ENCOUNTER — HOSPITAL ENCOUNTER (OUTPATIENT)
Facility: HOSPITAL | Age: 62
Setting detail: HOSPITAL OUTPATIENT SURGERY
Discharge: HOME | End: 2022-11-03
Attending: INTERNAL MEDICINE | Admitting: INTERNAL MEDICINE

## 2022-11-03 ENCOUNTER — ANESTHESIA EVENT (OUTPATIENT)
Dept: PERIOP | Facility: HOSPITAL | Age: 62
End: 2022-11-03

## 2022-11-03 VITALS
DIASTOLIC BLOOD PRESSURE: 65 MMHG | BODY MASS INDEX: 37.67 KG/M2 | RESPIRATION RATE: 20 BRPM | SYSTOLIC BLOOD PRESSURE: 129 MMHG | WEIGHT: 240 LBS | HEIGHT: 67 IN | HEART RATE: 82 BPM | OXYGEN SATURATION: 97 % | TEMPERATURE: 97.3 F

## 2022-11-03 PROCEDURE — 25010000002 MIDAZOLAM PER 1MG: Performed by: NURSE ANESTHETIST, CERTIFIED REGISTERED

## 2022-11-03 PROCEDURE — 45378 DIAGNOSTIC COLONOSCOPY: CPT | Performed by: INTERNAL MEDICINE

## 2022-11-03 PROCEDURE — 25010000002 PROPOFOL 10 MG/ML EMULSION: Performed by: NURSE ANESTHETIST, CERTIFIED REGISTERED

## 2022-11-03 RX ORDER — SODIUM CHLORIDE, SODIUM LACTATE, POTASSIUM CHLORIDE, CALCIUM CHLORIDE 600; 310; 30; 20 MG/100ML; MG/100ML; MG/100ML; MG/100ML
125 INJECTION, SOLUTION INTRAVENOUS ONCE
Status: COMPLETED | OUTPATIENT
Start: 2022-11-03 | End: 2022-11-03

## 2022-11-03 RX ORDER — LIDOCAINE HYDROCHLORIDE 20 MG/ML
INJECTION, SOLUTION EPIDURAL; INFILTRATION; INTRACAUDAL; PERINEURAL AS NEEDED
Status: DISCONTINUED | OUTPATIENT
Start: 2022-11-03 | End: 2022-11-03 | Stop reason: SURG

## 2022-11-03 RX ORDER — SODIUM CHLORIDE 0.9 % (FLUSH) 0.9 %
10 SYRINGE (ML) INJECTION AS NEEDED
Status: DISCONTINUED | OUTPATIENT
Start: 2022-11-03 | End: 2022-11-03 | Stop reason: HOSPADM

## 2022-11-03 RX ORDER — IPRATROPIUM BROMIDE AND ALBUTEROL SULFATE 2.5; .5 MG/3ML; MG/3ML
3 SOLUTION RESPIRATORY (INHALATION) ONCE AS NEEDED
Status: DISCONTINUED | OUTPATIENT
Start: 2022-11-03 | End: 2022-11-03 | Stop reason: HOSPADM

## 2022-11-03 RX ORDER — PROPOFOL 10 MG/ML
VIAL (ML) INTRAVENOUS CONTINUOUS PRN
Status: DISCONTINUED | OUTPATIENT
Start: 2022-11-03 | End: 2022-11-03 | Stop reason: SURG

## 2022-11-03 RX ORDER — MIDAZOLAM HYDROCHLORIDE 1 MG/ML
1 INJECTION INTRAMUSCULAR; INTRAVENOUS
Status: DISCONTINUED | OUTPATIENT
Start: 2022-11-03 | End: 2022-11-03 | Stop reason: HOSPADM

## 2022-11-03 RX ORDER — SODIUM CHLORIDE 0.9 % (FLUSH) 0.9 %
10 SYRINGE (ML) INJECTION EVERY 12 HOURS SCHEDULED
Status: DISCONTINUED | OUTPATIENT
Start: 2022-11-03 | End: 2022-11-03 | Stop reason: HOSPADM

## 2022-11-03 RX ORDER — MIDAZOLAM HYDROCHLORIDE 2 MG/2ML
INJECTION, SOLUTION INTRAMUSCULAR; INTRAVENOUS AS NEEDED
Status: DISCONTINUED | OUTPATIENT
Start: 2022-11-03 | End: 2022-11-03 | Stop reason: SURG

## 2022-11-03 RX ORDER — ONDANSETRON 2 MG/ML
4 INJECTION INTRAMUSCULAR; INTRAVENOUS AS NEEDED
Status: DISCONTINUED | OUTPATIENT
Start: 2022-11-03 | End: 2022-11-03 | Stop reason: HOSPADM

## 2022-11-03 RX ORDER — SODIUM CHLORIDE, SODIUM LACTATE, POTASSIUM CHLORIDE, CALCIUM CHLORIDE 600; 310; 30; 20 MG/100ML; MG/100ML; MG/100ML; MG/100ML
100 INJECTION, SOLUTION INTRAVENOUS ONCE AS NEEDED
Status: DISCONTINUED | OUTPATIENT
Start: 2022-11-03 | End: 2022-11-03 | Stop reason: HOSPADM

## 2022-11-03 RX ORDER — MEPERIDINE HYDROCHLORIDE 25 MG/ML
12.5 INJECTION INTRAMUSCULAR; INTRAVENOUS; SUBCUTANEOUS
Status: DISCONTINUED | OUTPATIENT
Start: 2022-11-03 | End: 2022-11-03 | Stop reason: HOSPADM

## 2022-11-03 RX ORDER — KETOROLAC TROMETHAMINE 30 MG/ML
30 INJECTION, SOLUTION INTRAMUSCULAR; INTRAVENOUS EVERY 6 HOURS PRN
Status: DISCONTINUED | OUTPATIENT
Start: 2022-11-03 | End: 2022-11-03 | Stop reason: HOSPADM

## 2022-11-03 RX ORDER — OXYCODONE HYDROCHLORIDE AND ACETAMINOPHEN 5; 325 MG/1; MG/1
1 TABLET ORAL ONCE AS NEEDED
Status: DISCONTINUED | OUTPATIENT
Start: 2022-11-03 | End: 2022-11-03 | Stop reason: HOSPADM

## 2022-11-03 RX ORDER — FENTANYL CITRATE 50 UG/ML
50 INJECTION, SOLUTION INTRAMUSCULAR; INTRAVENOUS
Status: DISCONTINUED | OUTPATIENT
Start: 2022-11-03 | End: 2022-11-03 | Stop reason: HOSPADM

## 2022-11-03 RX ADMIN — SODIUM CHLORIDE, POTASSIUM CHLORIDE, SODIUM LACTATE AND CALCIUM CHLORIDE: 600; 310; 30; 20 INJECTION, SOLUTION INTRAVENOUS at 10:46

## 2022-11-03 RX ADMIN — PROPOFOL 200 MCG/KG/MIN: 10 INJECTION, EMULSION INTRAVENOUS at 10:46

## 2022-11-03 RX ADMIN — LIDOCAINE HYDROCHLORIDE 60 MG: 20 INJECTION, SOLUTION EPIDURAL; INFILTRATION; INTRACAUDAL; PERINEURAL at 10:46

## 2022-11-03 RX ADMIN — MIDAZOLAM HYDROCHLORIDE 2 MG: 1 INJECTION, SOLUTION INTRAMUSCULAR; INTRAVENOUS at 10:46

## 2022-11-03 NOTE — ANESTHESIA POSTPROCEDURE EVALUATION
Patient: Trevon Jasso    Procedure Summary     Date: 11/03/22 Room / Location:  [facility] OR  /  [facility] OR    Anesthesia Start: 1044 Anesthesia Stop: 1106    Procedure: COLONOSCOPY Diagnosis:       Anal or rectal pain      Diarrhea, unspecified type      (Anal or rectal pain [K62.89])      (Diarrhea, unspecified type [R19.7])    Surgeons: Ines Yañez MD Provider: James Anton MD    Anesthesia Type: general ASA Status: 3          Anesthesia Type: general    Vitals  Vitals Value Taken Time   /65 11/03/22 1135   Temp 97.3 °F (36.3 °C) 11/03/22 1105   Pulse 82 11/03/22 1135   Resp 20 11/03/22 1135   SpO2 97 % 11/03/22 1135           Post Anesthesia Care and Evaluation    Patient location during evaluation: PACU  Patient participation: complete - patient participated  Level of consciousness: awake  Pain score: 0  Pain management: adequate    Airway patency: patent  Anesthetic complications: No anesthetic complications  PONV Status: none  Cardiovascular status: acceptable  Respiratory status: acceptable  Hydration status: acceptable

## 2022-11-03 NOTE — ANESTHESIA PREPROCEDURE EVALUATION
Anesthesia Evaluation     Patient summary reviewed and Nursing notes reviewed   no history of anesthetic complications:  NPO Solid Status: > 8 hours  NPO Liquid Status: > 8 hours           Airway   Mallampati: II  TM distance: <3 FB  Neck ROM: full  Large neck circumference  Dental - normal exam   (+) poor dentition    Comment: Multiple missing, none loose    Pulmonary - normal exam   (+) shortness of breath,   Cardiovascular - normal exam    ECG reviewed  Rhythm: regular    (+) hypertension,       Neuro/Psych  (+) headaches, numbness, psychiatric history Anxiety,      ROS Comment: History of Brain Tumor    FINDINGS:       Brain volume is within normal limits.  Ventricles are normal in size and  configuration. Mesial temporal lobe structures are reasonably  symmetrical in size and signal. There is no evidence of cortical  dysplasia or gray matter heterotopia.  There is no significant abnormal  signal on FLAIR or diffusion weighted images. There is no abnormal  parenchymal or extra-axial enhancement. There is no mass effect and the  basal cisterns are patent. [place] flow voids are maintained.   No significant abnormal sinonasal or temporal bone fluid is identified.     IMPRESSION:  Normal brain MRI.   GI/Hepatic/Renal/Endo    (+) obesity, morbid obesity, GERD, GI bleeding , liver disease history of elevated LFT, renal disease, thyroid problem hypothyroidism    Musculoskeletal     (+) radiculopathy  Abdominal   (+) obese,     Bowel sounds: normal.   Substance History - negative use     OB/GYN negative ob/gyn ROS         Other   blood dyscrasia anemia,     (-) history of cancer                    Anesthesia Plan    ASA 3     general     intravenous induction     Anesthetic plan, risks, benefits, and alternatives have been provided, discussed and informed consent has been obtained with: patient.    Use of blood products discussed with patient  Consented to blood products.       Lab Results   Component Value  Date    WBC 9.07 05/06/2022    HGB 10.9 (L) 05/06/2022    HCT 36.4 05/06/2022    MCV 79.6 05/06/2022     05/06/2022         Lab Results   Component Value Date    GLUCOSE 94 05/06/2022    BUN 9 05/06/2022    CREATININE 0.67 05/06/2022    EGFRIFNONA 76 11/17/2021    BCR 13.4 05/06/2022    K 3.8 05/06/2022    CO2 25.8 05/06/2022    CALCIUM 9.3 05/06/2022    ALBUMIN 4.80 10/30/2018    LABIL2 1.6 05/28/2016    AST 31 (H) 10/30/2018    ALT 40 (H) 10/30/2018

## 2022-11-03 NOTE — H&P
[facility]    Patient Identification:  Name:  Trevon Jasso  Age:  62 y.o.  Sex:  female  :  1960  MRN:  [id number]   Visit Number:  [id number]  Primary Care Physician:  Wily Cobb MD       Chief complaint: Diarrhea and bright red blood per rectum and personal history of colon polyps    History of presenting illness:  62 y.o. female presents today for colonoscopy secondary to chronic diarrhea. She states since she had her gallbladder removed due to epigastric pain.    Since her gallbladder was removed she states epigastric pain has resolved.  However, she is now having diarrhea.  She states after eating she will have to run to the bathroom to have a bowel movement. She has 5 to 6 bm daily.  Sometimes she can have 5 to 6 in the morning.   She describes her stool as a Anasco score 7. There is no associated weight loss. She is experiencing rectal pain described as burning.  She has noted since she started the Colestid that the number of bowel movements has reduced significantly.  She is having less abdominal cramping.  The bleeding has also improved with the hydrocortisone suppositories. She was told to eat fiber but noted this is causing more diarrhea. She denies abdominal pain. 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  She had this about 3 years ago and since then has tested positive for C. difficile.  She has been on antibiotic recently for bronchitis.  However, she states this does not seem like c. Diff to her because she recalls the terrible smell related with c. Difficle.  Her last colonoscopy was 2 years per her report.  She has had precancerous polyps.  She has a colonoscopy about every 2 years due to polyps.  ---------------------------------------------------------------------------------------------------------------------   Review of Systems   Constitutional: Negative.    HENT: Negative.    Eyes: Negative.    Respiratory: Negative.     Cardiovascular: Negative.    Gastrointestinal: Positive for abdominal distention, abdominal pain, anal bleeding, diarrhea and rectal pain. Negative for blood in stool, constipation, nausea and vomiting.   Endocrine: Negative.    Genitourinary: Negative.    Musculoskeletal: Negative.    Skin: Negative.    Allergic/Immunologic: Negative.    Neurological: Negative.    Hematological: Negative.    Psychiatric/Behavioral: Negative.    ---------------------------------------------------------------------------------------------------------------------   Past Medical History:   Diagnosis Date   • Anxiety    • Brain tumor (HCC)     brain spur   • Brain tumor (HCC)    • C. difficile colitis    • Carpal tunnel syndrome on both sides    • Disease of thyroid gland     s/p thyroidectomy    • Dyspnea    • GERD (gastroesophageal reflux disease)    • Hemorrhoids    • IBS (irritable colon syndrome)     IBS with diarrhea   • Kidney infection     treated recently-completed all antibiotics    • Loss, vision, sudden     episodes of vision loss-'black out vision'-reason unknown    • Memory problem    • Migraine    • Near syncope     reason unknown    • Nerve pain    • Rash     this summer-rash of unknown origin-has used a cream prn    • Skin rash     BLE/BUE with scattered red bumps, pt states present since Spring 2016   • Swelling of ankle    • Vitamin D deficiency      Past Surgical History:   Procedure Laterality Date   • ABDOMINAL SURGERY     • BREAST BIOPSY Left 5/10/2022    Procedure: BREAST BIOPSY;  Surgeon: Tea Mack MD;  Location: [facility];  Service: General;  Laterality: Left;   •  SECTION     • CHOLECYSTECTOMY N/A 2021    Procedure: CHOLECYSTECTOMY LAPAROSCOPIC;  Surgeon: Guillaume Fortune MD;  Location: [facility];  Service: General;  Laterality: N/A;   • COLONOSCOPY     • COLONOSCOPY W/ POLYPECTOMY     • ENDOSCOPY     • HYSTERECTOMY  1997    benign   • OTHER SURGICAL HISTORY      right lateral  femoral cutaneous nerve transection at the inguinal ligament    • PERONEAL NERVE DECOMPRESSION Right 08/19/2016    Procedure: RIGHT LATERAL FEMORAL CUTANEOUS NERVE TRANSECTION AT LIGUINAL LIGAMENT;  Surgeon: Ethan Tate MD;  Location: [facility] OR;  Service:    • THYROID SURGERY      removed because 'large'   • TONSILLECTOMY       Family History   Problem Relation Age of Onset   • Diabetes Mother    • Heart disease Mother    • Diabetes Father    • Heart disease Father    • Breast cancer Neg Hx      Social History     Socioeconomic History   • Marital status:    Tobacco Use   • Smoking status: Never   • Smokeless tobacco: Never   Vaping Use   • Vaping Use: Never used   Substance and Sexual Activity   • Alcohol use: No   • Drug use: No   • Sexual activity: Defer     Partners: Male     Birth control/protection: Surgical     ---------------------------------------------------------------------------------------------------------------------   Allergies:  Poison ivy extract and Poison oak extract  ---------------------------------------------------------------------------------------------------------------------   Prior to Admission Medications     Prescriptions Last Dose Informant Patient Reported? Taking?    Eluxadoline (Viberzi) 100 MG tablet Unknown  Yes No    Take 1 tablet by mouth Daily.    levothyroxine (SYNTHROID, LEVOTHROID) 200 MCG tablet 11/2/2022 Medication Bottle Yes Yes    Take 200 mcg by mouth daily.    levothyroxine (SYNTHROID, LEVOTHROID) 25 MCG tablet 11/2/2022  Yes Yes    Take 25 mcg by mouth Every Other Day.    montelukast (SINGULAIR) 10 MG tablet 11/2/2022  Yes Yes    pantoprazole (PROTONIX) 40 MG EC tablet 11/2/2022 Medication Bottle Yes Yes    Take 40 mg by mouth daily.    verapamil SR (CALAN-SR) 240 MG CR tablet 11/2/2022  No Yes    Take 1 tablet by mouth Daily.    colestipol (COLESTID) 1 g tablet Unknown  No No    Take 1 tablet by mouth Daily.    hydrocortisone (ANUSOL-HC) 25 MG  suppository Past Week  No Yes    Insert 1 suppository into the rectum 2 (Two) Times a Day As Needed for Hemorrhoids (rectal discomfort).        Hospital Scheduled Meds:  lactated ringers, 125 mL/hr, Intravenous, Once  sodium chloride, 10 mL, Intravenous, Q12H         ---------------------------------------------------------------------------------------------------------------------   Vital Signs:  Temp:  [97.7 °F (36.5 °C)] 97.7 °F (36.5 °C)  Heart Rate:  [90] 90  Resp:  [20] 20  BP: (160)/(80) 160/80      11/03/22  0920   Weight: 109 kg (240 lb)     Body mass index is 37.59 kg/m².  ---------------------------------------------------------------------------------------------------------------------   Physical Exam:  Constitutional:  Well-developed and well-nourished.  No respiratory distress.  Obese   HENT:  Head: Normocephalic and atraumatic.  Mouth:  Moist mucous membranes.    Eyes:  Conjunctivae and EOM are normal.  Pupils are equal, round, and reactive to light.  No scleral icterus.  Neck:  Neck supple.  No JVD present.    Cardiovascular:  Normal rate, regular rhythm and normal heart sounds with no murmur.  Pulmonary/Chest:  No respiratory distress, no wheezes, no crackles, with normal breath sounds and good air movement.  Abdominal:  Soft.  Bowel sounds are normal.  No distension and no tenderness.   Musculoskeletal:  No edema, no tenderness, and no deformity.  No red or swollen joints anywhere.    Neurological:  Alert and oriented to person, place, and time.  No cranial nerve deficit.  No tongue deviation.  No facial droop.  No slurred speech.   Skin:  Skin is warm and dry.  No rash noted.  No pallor.   Psychiatric:  Normal mood and affect.  Behavior is normal.  Judgment and thought content normal.   Peripheral vascular:  No edema and strong pulses on all 4 extremities.  Genitourinary:  ---------------------------------------------------------------------------------------------------------------------  CrCl  cannot be calculated (Patient's most recent lab result is older than the maximum 30 days allowed.).  No results found for: AMMONIA          No results found for: HGBA1C  Lab Results   Component Value Date    TSH 4.004 10/31/2018     No results found for: PREGTESTUR, PREGSERUM, HCG, HCGQUANT  Pain Management Panel     Pain Management Panel Latest Ref Rng & Units 7/20/2016 2/10/2016    AMPHETAMINES SCREEN, URINE Negative Negative Negative    BARBITURATES SCREEN Negative Negative Negative    BENZODIAZEPINE SCREEN, URINE Negative Negative Negative    COCAINE SCREEN, URINE Negative Negative Negative    METHADONE SCREEN, URINE Negative Negative Negative        No results found for: BLOODCX  No results found for: URINECX  No results found for: WOUNDCX  No results found for: STOOLCX      ---------------------------------------------------------------------------------------------------------------------  Imaging Results (Last 7 Days)     ** No results found for the last 168 hours. **          I have personally reviewed the radiology images and read the final radiology report.  ---------------------------------------------------------------------------------------------------------------------  Assessment and Plan:  Proceed with colonoscopy secondary to diarrhea and bright red blood per rectum and personal history of colon polyps.  Ines Yañez MD  11/03/22  09:34 EDT

## 2023-01-03 ENCOUNTER — TELEPHONE (OUTPATIENT)
Dept: UROLOGY | Facility: CLINIC | Age: 63
End: 2023-01-03
Payer: COMMERCIAL

## 2023-01-03 DIAGNOSIS — R19.7 DIARRHEA, UNSPECIFIED TYPE: ICD-10-CM

## 2023-01-03 RX ORDER — MONTELUKAST SODIUM 4 MG/1
1 TABLET, CHEWABLE ORAL DAILY
Qty: 30 TABLET | Refills: 11 | Status: SHIPPED | OUTPATIENT
Start: 2023-01-03

## 2023-01-03 NOTE — TELEPHONE ENCOUNTER
Pt need a refill on colestipol sent to [facility] pharmacy on [address].

## 2023-02-24 ENCOUNTER — OFFICE VISIT (OUTPATIENT)
Dept: GASTROENTEROLOGY | Facility: CLINIC | Age: 63
End: 2023-02-24
Payer: COMMERCIAL

## 2023-02-24 VITALS
WEIGHT: 250 LBS | HEART RATE: 85 BPM | BODY MASS INDEX: 39.24 KG/M2 | DIASTOLIC BLOOD PRESSURE: 71 MMHG | HEIGHT: 67 IN | SYSTOLIC BLOOD PRESSURE: 160 MMHG

## 2023-02-24 DIAGNOSIS — R19.7 DIARRHEA, UNSPECIFIED TYPE: Primary | ICD-10-CM

## 2023-02-24 PROCEDURE — 99213 OFFICE O/P EST LOW 20 MIN: CPT | Performed by: PHYSICIAN ASSISTANT

## 2023-02-24 NOTE — PROGRESS NOTES
Chief Complaint   Patient presents with   • Rectal Pain       Trevon Jasso is a 62 y.o. female who presents to the office today for evaluation of Rectal Pain  .    HPI    The patient is here for a follow-up for rectal pain. She saw Dr. Sanchez back in 09/2022, in which she was having bright red blood per rectum and diarrhea and was having rectal pain that was described as burning. Dr. Sanchez scheduled her for a colonoscopy and prescribed her hydrocortisone suppositories for hemorrhoidal irritation. She underwent that procedure back in 11/2022, in which results showed normal perianal digital rectal exam. Her entire colon appeared normal. Internal hemorrhoids found during retroflexion grade 1.    The patient reports that she is doing way better. She mentions that she was given colestipol. She reports that she is still having diarrhea, but not daily. The patient mentions that it is not uncontrollable. She notes that she has been trying to figure out what she can eat. The patient reports that she can eat a yogurt today, and it would be fine, but yesterday it might have run her straight to the bathroom. She mentions that she has been looking up the Metamucil 2-week challenge. The patient notes that she has been trying a lot of different fiber. She reports that they mostly get her extremely gassy and bloated. The patient mentions that the Anusol suppositories did help quite a bit when it happened. She notes that she would rather keep the colestipol once a day.     Review of Systems   Constitutional: Negative.    HENT: Negative.  Negative for trouble swallowing.    Eyes: Negative.    Respiratory: Negative.    Cardiovascular: Negative.    Gastrointestinal: Positive for diarrhea. Negative for abdominal distention, abdominal pain, anal bleeding, blood in stool, constipation, nausea, rectal pain and vomiting.   Endocrine: Negative.    Genitourinary: Negative.    Musculoskeletal: Negative.    Skin: Negative.     Allergic/Immunologic: Negative.    Neurological: Negative.    Hematological: Negative.    Psychiatric/Behavioral: Negative.        ACTIVE PROBLEMS:   Specialty Problems        Gastroenterology Problems    Bleeding gastrointestinal           PAST MEDICAL HISTORY:  Past Medical History:   Diagnosis Date   • Anxiety    • Brain tumor (HCC)     brain spur   • Brain tumor (HCC)    • C. difficile colitis    • Carpal tunnel syndrome on both sides    • Disease of thyroid gland     s/p thyroidectomy    • Dyspnea    • GERD (gastroesophageal reflux disease)    • Hemorrhoids    • IBS (irritable colon syndrome)     IBS with diarrhea   • Kidney infection     treated recently-completed all antibiotics    • Loss, vision, sudden     episodes of vision loss-'black out vision'-reason unknown    • Memory problem    • Migraine    • Near syncope     reason unknown    • Nerve pain    • Rash     this summer-rash of unknown origin-has used a cream prn    • Skin rash     BLE/BUE with scattered red bumps, pt states present since Spring 2016   • Swelling of ankle    • Vitamin D deficiency        SURGICAL HISTORY:  Past Surgical History:   Procedure Laterality Date   • ABDOMINAL SURGERY     • BREAST BIOPSY Left 5/10/2022    Procedure: BREAST BIOPSY;  Surgeon: Tea Mack MD;  Location: [facility];  Service: General;  Laterality: Left;   •  SECTION     • CHOLECYSTECTOMY N/A 2021    Procedure: CHOLECYSTECTOMY LAPAROSCOPIC;  Surgeon: Guillaume Fortune MD;  Location: [facility] OR;  Service: General;  Laterality: N/A;   • COLONOSCOPY     • COLONOSCOPY N/A 11/3/2022    Procedure: COLONOSCOPY;  Surgeon: Ines Yañez MD;  Location: [facility];  Service: Gastroenterology;  Laterality: N/A;   • COLONOSCOPY W/ POLYPECTOMY     • ENDOSCOPY     • HYSTERECTOMY      benign   • OTHER SURGICAL HISTORY      right lateral femoral cutaneous nerve transection at the inguinal ligament    • PERONEAL NERVE DECOMPRESSION Right  "08/19/2016    Procedure: RIGHT LATERAL FEMORAL CUTANEOUS NERVE TRANSECTION AT LIGUINAL LIGAMENT;  Surgeon: Ethan Tate MD;  Location: [facility];  Service:    • THYROID SURGERY      removed because 'large'   • TONSILLECTOMY         FAMILY HISTORY:  Family History   Problem Relation Age of Onset   • Diabetes Mother    • Heart disease Mother    • Diabetes Father    • Heart disease Father    • Breast cancer Neg Hx        SOCIAL HISTORY:  Social History     Tobacco Use   • Smoking status: Never   • Smokeless tobacco: Never   Substance Use Topics   • Alcohol use: No       CURRENT MEDICATION:    Current Outpatient Medications:   •  colestipol (COLESTID) 1 g tablet, Take 1 tablet by mouth Daily., Disp: 30 tablet, Rfl: 11  •  hydrocortisone (ANUSOL-HC) 25 MG suppository, Insert 1 suppository into the rectum 2 (Two) Times a Day As Needed for Hemorrhoids (rectal discomfort)., Disp: 30 suppository, Rfl: 5  •  levothyroxine (SYNTHROID, LEVOTHROID) 200 MCG tablet, Take 200 mcg by mouth daily., Disp: , Rfl:   •  levothyroxine (SYNTHROID, LEVOTHROID) 25 MCG tablet, Take 25 mcg by mouth Every Other Day., Disp: , Rfl:   •  montelukast (SINGULAIR) 10 MG tablet, , Disp: , Rfl:   •  pantoprazole (PROTONIX) 40 MG EC tablet, Take 40 mg by mouth daily., Disp: , Rfl:   •  verapamil SR (CALAN-SR) 240 MG CR tablet, Take 1 tablet by mouth Daily., Disp: 30 tablet, Rfl: 3    ALLERGIES:  Poison ivy extract and Poison oak extract    VISIT VITALS:  /71   Pulse 85   Ht 170.2 cm (67\")   Wt 113 kg (250 lb)   LMP  (LMP Unknown)   BMI 39.16 kg/m²   Physical Exam  Constitutional:       General: She is not in acute distress.     Appearance: Normal appearance. She is well-developed.   HENT:      Head: Normocephalic and atraumatic.   Eyes:      Pupils: Pupils are equal, round, and reactive to light.   Cardiovascular:      Rate and Rhythm: Normal rate and regular rhythm.      Heart sounds: Normal heart sounds.   Pulmonary:      Effort: " Pulmonary effort is normal. No respiratory distress.      Breath sounds: Normal breath sounds. No wheezing, rhonchi or rales.   Abdominal:      General: Abdomen is flat. Bowel sounds are normal. There is no distension.      Palpations: Abdomen is soft. There is no mass.      Tenderness: There is no abdominal tenderness. There is no guarding or rebound.      Hernia: No hernia is present.   Musculoskeletal:         General: No swelling. Normal range of motion.      Cervical back: Normal range of motion and neck supple.      Right lower leg: No edema.      Left lower leg: No edema.   Skin:     General: Skin is warm and dry.   Neurological:      Mental Status: She is alert and oriented to person, place, and time.   Psychiatric:         Attention and Perception: Attention normal.         Mood and Affect: Mood normal.         Speech: Speech normal.         Behavior: Behavior normal. Behavior is cooperative.         Thought Content: Thought content normal.         Assessment      1. Diarrhea  - She will continue colestipol once daily.  - She will continue hydrocortisone suppositories as needed.  - She will follow-up on 02/2024.     Diagnosis Plan   1. Diarrhea, unspecified type            Return in about 1 year (around 2/24/2024).                   This document has been electronically signed by Tano Amaya PA-C  February 24, 2023 12:59 EST    Part of this note may be an electronic transcription/translation of spoken language to printed text using the Dragon Dictation System.          Transcribed from ambient dictation for Tano Amaya PA-C by Summer Rodriguez.  02/24/23   12:12 EST    Patient or patient representative verbalized consent to the visit recording.  I have personally performed the services described in this document as transcribed by the above individual, and it is both accurate and complete.

## 2023-09-26 ENCOUNTER — TRANSCRIBE ORDERS (OUTPATIENT)
Dept: ADMINISTRATIVE | Facility: HOSPITAL | Age: 63
End: 2023-09-26
Payer: COMMERCIAL

## 2023-09-26 DIAGNOSIS — M25.562 PAIN IN JOINT OF LEFT KNEE: Primary | ICD-10-CM

## 2023-10-30 ENCOUNTER — APPOINTMENT (OUTPATIENT)
Dept: GENERAL RADIOLOGY | Facility: HOSPITAL | Age: 63
End: 2023-10-30
Payer: COMMERCIAL

## 2023-10-30 ENCOUNTER — HOSPITAL ENCOUNTER (EMERGENCY)
Facility: HOSPITAL | Age: 63
Discharge: HOME | End: 2023-10-30
Attending: EMERGENCY MEDICINE | Admitting: EMERGENCY MEDICINE
Payer: COMMERCIAL

## 2023-10-30 VITALS
SYSTOLIC BLOOD PRESSURE: 160 MMHG | BODY MASS INDEX: 36.1 KG/M2 | OXYGEN SATURATION: 100 % | WEIGHT: 230 LBS | RESPIRATION RATE: 18 BRPM | HEIGHT: 67 IN | HEART RATE: 86 BPM | TEMPERATURE: 98 F | DIASTOLIC BLOOD PRESSURE: 62 MMHG

## 2023-10-30 DIAGNOSIS — G89.29 CHRONIC PAIN OF LEFT KNEE: Primary | ICD-10-CM

## 2023-10-30 DIAGNOSIS — M25.562 CHRONIC PAIN OF LEFT KNEE: Primary | ICD-10-CM

## 2023-10-30 PROCEDURE — 73562 X-RAY EXAM OF KNEE 3: CPT | Performed by: RADIOLOGY

## 2023-10-30 PROCEDURE — 73562 X-RAY EXAM OF KNEE 3: CPT

## 2023-10-30 PROCEDURE — 99283 EMERGENCY DEPT VISIT LOW MDM: CPT

## 2023-10-30 RX ORDER — DICLOFENAC SODIUM 75 MG/1
75 TABLET, DELAYED RELEASE ORAL 2 TIMES DAILY PRN
Qty: 12 TABLET | Refills: 0 | Status: SHIPPED | OUTPATIENT
Start: 2023-10-30

## 2023-10-30 NOTE — ED PROVIDER NOTES
Subjective   History of Present Illness  64 yo female pt presents to the ED with complaints of chrinuc left knee pain. Pt states that she has eboni seen by the PCP numerous times and was told she needed a MRI.  Pt states she is claustrophobic so she does not want it. Pt states she was at work today and the pain worsened after she felt a pop.  Pt states that he knee pain radiates through her whole leg.     History provided by:  Patient   used: No        Review of Systems   Constitutional: Negative.    HENT: Negative.     Eyes: Negative.    Respiratory: Negative.     Cardiovascular: Negative.    Gastrointestinal: Negative.    Endocrine: Negative.    Genitourinary: Negative.    Musculoskeletal:         +left knee pain   Skin: Negative.    Allergic/Immunologic: Negative.    Neurological: Negative.    Hematological: Negative.    Psychiatric/Behavioral: Negative.     All other systems reviewed and are negative.      Past Medical History:   Diagnosis Date    Anxiety     Brain tumor     brain spur    Brain tumor     C. difficile colitis 2015    Carpal tunnel syndrome on both sides     Disease of thyroid gland     s/p thyroidectomy     Dyspnea     GERD (gastroesophageal reflux disease)     Hemorrhoids     IBS (irritable colon syndrome)     IBS with diarrhea    Kidney infection     treated recently-completed all antibiotics     Loss, vision, sudden     episodes of vision loss-'black out vision'-reason unknown     Memory problem     Migraine     Near syncope     reason unknown     Nerve pain     Rash     this summer-rash of unknown origin-has used a cream prn     Skin rash     BLE/BUE with scattered red bumps, pt states present since Spring 2016    Swelling of ankle     Vitamin D deficiency        Allergies   Allergen Reactions    Poison Ivy Extract Hives and Rash     Skin blisters      Poison Oak Extract Hives and Rash     Skin blisters        Past Surgical History:   Procedure Laterality Date    ABDOMINAL  SURGERY      BREAST BIOPSY Left 5/10/2022    Procedure: BREAST BIOPSY;  Surgeon: Tea Mack MD;  Location: [facility] OR;  Service: General;  Laterality: Left;     SECTION      CHOLECYSTECTOMY N/A 2021    Procedure: CHOLECYSTECTOMY LAPAROSCOPIC;  Surgeon: Guillaume Fortune MD;  Location:  [facility] OR;  Service: General;  Laterality: N/A;    COLONOSCOPY      COLONOSCOPY N/A 11/3/2022    Procedure: COLONOSCOPY;  Surgeon: Ines Yañez MD;  Location:  [facility] OR;  Service: Gastroenterology;  Laterality: N/A;    COLONOSCOPY W/ POLYPECTOMY      ENDOSCOPY      HYSTERECTOMY      benign    OTHER SURGICAL HISTORY      right lateral femoral cutaneous nerve transection at the inguinal ligament     PERONEAL NERVE DECOMPRESSION Right 2016    Procedure: RIGHT LATERAL FEMORAL CUTANEOUS NERVE TRANSECTION AT LIGUINAL LIGAMENT;  Surgeon: Ethan Tate MD;  Location:  [facility] OR;  Service:     THYROID SURGERY      removed because 'large'    TONSILLECTOMY         Family History   Problem Relation Age of Onset    Diabetes Mother     Heart disease Mother     Diabetes Father     Heart disease Father     Breast cancer Neg Hx        Social History     Socioeconomic History    Marital status:    Tobacco Use    Smoking status: Never    Smokeless tobacco: Never   Vaping Use    Vaping Use: Never used   Substance and Sexual Activity    Alcohol use: No    Drug use: No    Sexual activity: Defer     Partners: Male     Birth control/protection: Surgical           Objective   Physical Exam  Vitals and nursing note reviewed.   Constitutional:       Appearance: Normal appearance. She is normal weight.   HENT:      Head: Normocephalic and atraumatic.      Right Ear: External ear normal.      Left Ear: External ear normal.      Nose: Nose normal.      Mouth/Throat:      Mouth: Mucous membranes are moist.      Pharynx: Oropharynx is clear.   Eyes:      Extraocular Movements: Extraocular movements intact.       Conjunctiva/sclera: Conjunctivae normal.      Pupils: Pupils are equal, round, and reactive to light.   Cardiovascular:      Rate and Rhythm: Normal rate and regular rhythm.      Pulses: Normal pulses.      Heart sounds: Normal heart sounds.   Pulmonary:      Effort: Pulmonary effort is normal.      Breath sounds: Normal breath sounds.   Abdominal:      General: Abdomen is flat. Bowel sounds are normal.      Palpations: Abdomen is soft.   Musculoskeletal:         General: Normal range of motion.      Cervical back: Normal range of motion and neck supple.      Left knee: No swelling or bony tenderness. Normal range of motion. Tenderness present. Normal pulse.   Skin:     General: Skin is warm and dry.      Capillary Refill: Capillary refill takes less than 2 seconds.   Neurological:      General: No focal deficit present.      Mental Status: She is alert and oriented to person, place, and time. Mental status is at baseline.   Psychiatric:         Mood and Affect: Mood normal.         Behavior: Behavior normal.         Thought Content: Thought content normal.         Judgment: Judgment normal.         Procedures           ED Course  ED Course as of 10/30/23 1255   Mon Oct 30, 2023   1232 XR Knee 3 View Left [ML]      ED Course User Index  [ML] Mohini Mac PA              XR Knee 3 View Left    Result Date: 10/30/2023    No acute findings in the left knee.   This report was finalized on 10/30/2023 12:28 PM by Dr. Tucker Kuhn MD.                                  Medical Decision Making  64 yo female pt presents to the ED with complaints of chrinuc left knee pain. Pt states that she has eboni seen by the PCP numerous times and was told she needed a MRI.  Pt states she is claustrophobic so she does not want it. Pt states she was at work today and the pain worsened after she felt a pop.  Pt states that he knee pain radiates through her whole leg.   Negative imaging. Pt will f/u with PCP tomorrow. Discussed sx  and red flags that would warrant return to the ED.     Problems Addressed:  Chronic pain of left knee: complicated acute illness or injury    Amount and/or Complexity of Data Reviewed  Radiology: ordered. Decision-making details documented in ED Course.    Risk  Prescription drug management.        Final diagnoses:   Chronic pain of left knee       ED Disposition  ED Disposition       ED Disposition   Discharge    Condition   Stable    Comment   --               Wily Cobb MD  [address] [facility] [facility] [address]  [phone]    Schedule an appointment as soon as possible for a visit in 1 day      Tucker Boyle MD  [address] [place] [id number] [phone]    Schedule an appointment as soon as possible for a visit in 1 day           Medication List        New Prescriptions      diclofenac 75 MG EC tablet  Commonly known as: VOLTAREN  Take 1 tablet by mouth 2 (Two) Times a Day As Needed (pain).               Where to Get Your Medications        These medications were sent to [facility] - [place], [place] - [address] [place] [address] - [phone]  - [phone] [address] [address]      Phone: [phone]   diclofenac 75 MG EC tablet            Mohini Mac PA  10/30/23 0316

## 2023-11-22 ENCOUNTER — HOSPITAL ENCOUNTER (OUTPATIENT)
Dept: MRI IMAGING | Facility: HOSPITAL | Age: 63
Discharge: HOME | End: 2023-11-22
Admitting: FAMILY MEDICINE
Payer: COMMERCIAL

## 2023-11-22 DIAGNOSIS — M25.562 PAIN IN JOINT OF LEFT KNEE: ICD-10-CM

## 2023-11-22 PROCEDURE — 73721 MRI JNT OF LWR EXTRE W/O DYE: CPT

## 2024-05-31 ENCOUNTER — OFFICE VISIT (OUTPATIENT)
Dept: GASTROENTEROLOGY | Facility: CLINIC | Age: 64
End: 2024-05-31
Payer: COMMERCIAL

## 2024-05-31 VITALS
HEART RATE: 90 BPM | WEIGHT: 248 LBS | SYSTOLIC BLOOD PRESSURE: 119 MMHG | BODY MASS INDEX: 38.92 KG/M2 | DIASTOLIC BLOOD PRESSURE: 73 MMHG | HEIGHT: 67 IN

## 2024-05-31 DIAGNOSIS — K21.9 GASTROESOPHAGEAL REFLUX DISEASE, UNSPECIFIED WHETHER ESOPHAGITIS PRESENT: ICD-10-CM

## 2024-05-31 DIAGNOSIS — Z86.19 HISTORY OF CLOSTRIDIOIDES DIFFICILE COLITIS: ICD-10-CM

## 2024-05-31 DIAGNOSIS — Z90.49 S/P CHOLECYSTECTOMY: Primary | ICD-10-CM

## 2024-05-31 DIAGNOSIS — R19.7 DIARRHEA, UNSPECIFIED TYPE: ICD-10-CM

## 2024-05-31 RX ORDER — MONTELUKAST SODIUM 4 MG/1
1 TABLET, CHEWABLE ORAL 2 TIMES DAILY
Qty: 60 TABLET | Refills: 11 | Status: SHIPPED | OUTPATIENT
Start: 2024-05-31

## 2024-05-31 NOTE — PROGRESS NOTES
DATE:  5/31/2024    DIAGNOSIS: Diarrhea    CHIEF COMPLAINT:  Chief Complaint   Patient presents with    Diarrhea     Interval History:  Ms. Jasso has previously been following with Tano Amaya PA-C and was last seen in February 2023.  Please see previous notes for prior management.  In review of her records, patient has had chronic difficulty with diarrhea.  Of note, she is s/p cholecystectomy.  Therefore, she was started on trial of Colestid for suspected bile acid diarrhea which was has been helpful.  She previously underwent colonoscopy with Dr. Sanchez on 11/3/2022 which showed grade 1 internal hemorrhoids, otherwise unremarkable.  Given improvement in diarrhea with Colestid, random colon biopsies were not performed. Patient does have personal history of colon polyps.  Therefore, repeat colonoscopy was recommended in 5 years.  Since her last visit, clinically she has continued to do well.  She is taking Colestid 1 g p.o. daily with improvement in diarrhea.  She reports having 1-2 BMs per day with stool type VI-VII on BSS.  She is also taking Metamucil daily.  Denies having melena or bright red bleeding per rectum.  Although diarrhea has improved, she would like to have diarrhea less often.  She has noticed salads, dairy and fruit/veggies exacerbate diarrhea.  Patient reports having history of C. difficile several years ago.  However, this has not been a recurrent issue.  She is also eating yogurt with live active cultures daily.  She reports GERD is well-controlled with Protonix 40 mg p.o. daily.  She has no other complaints today.    PAST MEDICAL HISTORY:  Past Medical History:   Diagnosis Date    Anxiety     Brain tumor     brain spur    Brain tumor     C. difficile colitis 2015    Carpal tunnel syndrome on both sides     Disease of thyroid gland     s/p thyroidectomy     Dyspnea     GERD (gastroesophageal reflux disease)     Hemorrhoids     IBS (irritable colon syndrome)     IBS with diarrhea    Kidney  infection     treated recently-completed all antibiotics     Loss, vision, sudden     episodes of vision loss-'black out vision'-reason unknown     Memory problem     Migraine     Near syncope     reason unknown     Nerve pain     Rash     this summer-rash of unknown origin-has used a cream prn     Skin rash     BLE/BUE with scattered red bumps, pt states present since Spring 2016    Swelling of ankle     Vitamin D deficiency        PAST SURGICAL HISTORY:  Past Surgical History:   Procedure Laterality Date    ABDOMINAL SURGERY      BREAST BIOPSY Left 5/10/2022    Procedure: BREAST BIOPSY;  Surgeon: Tea Mack MD;  Location: [facility] OR;  Service: General;  Laterality: Left;     SECTION      CHOLECYSTECTOMY N/A 2021    Procedure: CHOLECYSTECTOMY LAPAROSCOPIC;  Surgeon: Guillaume Fortune MD;  Location: [facility] OR;  Service: General;  Laterality: N/A;    COLONOSCOPY      COLONOSCOPY N/A 11/3/2022    Procedure: COLONOSCOPY;  Surgeon: Ines Yañez MD;  Location: [facility] OR;  Service: Gastroenterology;  Laterality: N/A;    COLONOSCOPY W/ POLYPECTOMY      ENDOSCOPY      HYSTERECTOMY      benign    OTHER SURGICAL HISTORY      right lateral femoral cutaneous nerve transection at the inguinal ligament     PERONEAL NERVE DECOMPRESSION Right 2016    Procedure: RIGHT LATERAL FEMORAL CUTANEOUS NERVE TRANSECTION AT LIGUINAL LIGAMENT;  Surgeon: Ethan Tate MD;  Location: [facility] OR;  Service:     THYROID SURGERY      removed because 'large'    TONSILLECTOMY         SOCIAL HISTORY:  Social History     Socioeconomic History    Marital status:    Tobacco Use    Smoking status: Never    Smokeless tobacco: Never   Vaping Use    Vaping status: Never Used   Substance and Sexual Activity    Alcohol use: No    Drug use: No    Sexual activity: Defer     Partners: Male     Birth control/protection: Surgical       FAMILY HISTORY:  Family History   Problem Relation Age of Onset    Diabetes  Mother     Heart disease Mother     Diabetes Father     Heart disease Father     Breast cancer Neg Hx        MEDICATIONS:  The current medication list was reviewed in the EMR    Current Outpatient Medications:     colestipol (COLESTID) 1 g tablet, Take 1 tablet by mouth Daily., Disp: 30 tablet, Rfl: 11    diclofenac (VOLTAREN) 75 MG EC tablet, Take 1 tablet by mouth 2 (Two) Times a Day As Needed (pain)., Disp: 12 tablet, Rfl: 0    hydrocortisone (ANUSOL-HC) 25 MG suppository, Insert 1 suppository into the rectum 2 (Two) Times a Day As Needed for Hemorrhoids (rectal discomfort)., Disp: 30 suppository, Rfl: 5    levothyroxine (SYNTHROID, LEVOTHROID) 200 MCG tablet, Take 1 tablet by mouth Daily., Disp: , Rfl:     levothyroxine (SYNTHROID, LEVOTHROID) 25 MCG tablet, Take 1 tablet by mouth Every Other Day., Disp: , Rfl:     montelukast (SINGULAIR) 10 MG tablet, , Disp: , Rfl:     pantoprazole (PROTONIX) 40 MG EC tablet, Take 1 tablet by mouth Daily., Disp: , Rfl:     verapamil SR (CALAN-SR) 240 MG CR tablet, Take 1 tablet by mouth Daily., Disp: 30 tablet, Rfl: 3    ALLERGIES:    Allergies   Allergen Reactions    Poison Ivy Extract Hives and Rash     Skin blisters      Poison Oak Extract Hives and Rash     Skin blisters      REVIEW OF SYSTEMS:    A comprehensive 14 point review of systems was performed.  Significant findings as mentioned above.  All other systems reviewed and are negative.        Physical Exam   Vital Signs:   Vitals:    05/31/24 0954   BP: 119/73   Pulse: 90    General: Well developed, well nourished, alert and oriented x 3, in no acute distress.   Head: ATNC   Eyes: PERRL, No evidence of conjunctivitis.   Nose: No nasal discharge.   Mouth: Oral mucosal membranes moist. No oral ulceration or hemorrhages.   Neck: Neck supple. No thyromegaly. No JVD.   Lungs: Clear in all fields to A&P without rales, rhonchi or wheezing.   Heart: Regular rate and rhythm. No murmurs, rubs, or gallops.   Abdomen: Soft. Bowel  sounds are normoactive. Nontender with palpation.   Extremities: No cyanosis or edema.   Neurologic: MS as above. Grossly non focal exam.      RECENT LABS:  Lab Results   Component Value Date    WBC 9.07 05/06/2022    HGB 10.9 (L) 05/06/2022    HCT 36.4 05/06/2022    MCV 79.6 05/06/2022    RDW 17.5 (H) 05/06/2022     05/06/2022    NEUTRORELPCT 71.1 05/06/2022    LYMPHORELPCT 20.8 05/06/2022    MONORELPCT 5.8 05/06/2022    EOSRELPCT 1.5 05/06/2022    BASORELPCT 0.6 05/06/2022    NEUTROABS 6.44 05/06/2022    LYMPHSABS 1.89 05/06/2022       Lab Results   Component Value Date     05/06/2022    K 3.8 05/06/2022    CO2 25.8 05/06/2022     05/06/2022    BUN 9 05/06/2022    CREATININE 0.67 05/06/2022    EGFRIFNONA 76 11/17/2021    GLUCOSE 94 05/06/2022    CALCIUM 9.3 05/06/2022    ALKPHOS 101 10/30/2018    AST 31 (H) 10/30/2018    ALT 40 (H) 10/30/2018    BILITOT 0.5 10/30/2018    ALBUMIN 4.80 10/30/2018    PROTEINTOT 7.4 10/30/2018    MG 1.8 06/28/2015    PHOS 3.2 06/28/2015       ASSESSMENT & PLAN:  Trevon Jasso is a very pleasant 63 y.o. female with    1.  Diarrhea:  2.  S/p cholecystectomy:  3.  History of C. difficile:    -As above, diarrhea has been a chronic issue. She was started on Colestid for suspected bile acid diarrhea which has been helpful as above.  Therefore, will continue.  Refill provided for Colestid 1 g p.o. which she can take once or twice daily.  She was advised to monitor for signs and symptoms of constipation and how to adjust dose if needed.  She will also continue with Metamucil daily.  -Of note, she previously underwent colonoscopy with Dr. Sanchez on 11/3/2022 which showed grade 1 internal hemorrhoids, otherwise unremarkable.  Given improvement in diarrhea with Colestid, random colon biopsies were not performed. Patient does have personal history of colon polyps.  Therefore, repeat colonoscopy was recommended in 5 years.  We reviewed findings today.    4.  GERD:  -Currently  well-controlled with Protonix 40 mg p.o. daily which is being refilled by her PCP.  Will monitor.    -Will have patient return to clinic in 6 months for symptom check.          Electronically Signed by: BLADIMIR Wagoner ,  May 31, 2024 09:59 EDT       CC:   Wily Cobb MD

## 2024-10-01 ENCOUNTER — HOSPITAL ENCOUNTER (OUTPATIENT)
Facility: HOSPITAL | Age: 64
Discharge: HOME | End: 2024-10-01
Payer: COMMERCIAL

## 2024-10-01 ENCOUNTER — TRANSCRIBE ORDERS (OUTPATIENT)
Dept: ADMINISTRATIVE | Facility: HOSPITAL | Age: 64
End: 2024-10-01
Payer: COMMERCIAL

## 2024-10-01 DIAGNOSIS — E89.0 HYPOTHYROIDISM ASSOCIATED WITH SURGICAL PROCEDURE: ICD-10-CM

## 2024-10-01 DIAGNOSIS — R60.1 GENERALIZED EDEMA: ICD-10-CM

## 2024-10-01 DIAGNOSIS — E89.0 HYPOTHYROIDISM ASSOCIATED WITH SURGICAL PROCEDURE: Primary | ICD-10-CM

## 2024-10-01 LAB
ALBUMIN SERPL-MCNC: 4.1 G/DL (ref 3.5–5.2)
ALBUMIN/GLOB SERPL: 1.4 G/DL
ALP SERPL-CCNC: 101 U/L (ref 39–117)
ALT SERPL W P-5'-P-CCNC: 19 U/L (ref 1–33)
ANION GAP SERPL CALCULATED.3IONS-SCNC: 9.7 MMOL/L (ref 5–15)
AST SERPL-CCNC: 18 U/L (ref 1–32)
BASOPHILS # BLD AUTO: 0.06 10*3/MM3 (ref 0–0.2)
BASOPHILS NFR BLD AUTO: 0.6 % (ref 0–1.5)
BILIRUB SERPL-MCNC: 0.3 MG/DL (ref 0–1.2)
BUN SERPL-MCNC: 12 MG/DL (ref 8–23)
BUN/CREAT SERPL: 16.4 (ref 7–25)
CALCIUM SPEC-SCNC: 8.8 MG/DL (ref 8.6–10.5)
CHLORIDE SERPL-SCNC: 106 MMOL/L (ref 98–107)
CHOLEST SERPL-MCNC: 149 MG/DL (ref 0–200)
CO2 SERPL-SCNC: 25.3 MMOL/L (ref 22–29)
CREAT SERPL-MCNC: 0.73 MG/DL (ref 0.57–1)
EGFRCR SERPLBLD CKD-EPI 2021: 92 ML/MIN/1.73
EOSINOPHIL # BLD AUTO: 0.25 10*3/MM3 (ref 0–0.4)
EOSINOPHIL NFR BLD AUTO: 2.6 % (ref 0.3–6.2)
GLOBULIN UR ELPH-MCNC: 2.9 GM/DL
GLUCOSE SERPL-MCNC: 109 MG/DL (ref 65–99)
HDLC SERPL-MCNC: 43 MG/DL (ref 40–60)
LDLC SERPL CALC-MCNC: 85 MG/DL (ref 0–100)
LDLC/HDLC SERPL: 1.92 {RATIO}
LYMPHOCYTES # BLD AUTO: 2.01 10*3/MM3 (ref 0.7–3.1)
LYMPHOCYTES NFR BLD AUTO: 20.6 % (ref 19.6–45.3)
MONOCYTES # BLD AUTO: 0.6 10*3/MM3 (ref 0.1–0.9)
MONOCYTES NFR BLD AUTO: 6.1 % (ref 5–12)
NEUTROPHILS NFR BLD AUTO: 6.8 10*3/MM3 (ref 1.7–7)
NEUTROPHILS NFR BLD AUTO: 69.5 % (ref 42.7–76)
POTASSIUM SERPL-SCNC: 3.4 MMOL/L (ref 3.5–5.2)
PROT SERPL-MCNC: 7 G/DL (ref 6–8.5)
SODIUM SERPL-SCNC: 141 MMOL/L (ref 136–145)
TRIGL SERPL-MCNC: 118 MG/DL (ref 0–150)
TSH SERPL DL<=0.05 MIU/L-ACNC: 1.83 UIU/ML (ref 0.27–4.2)
VLDLC SERPL-MCNC: 21 MG/DL (ref 5–40)
WBC NRBC COR # BLD AUTO: 9.78 10*3/MM3 (ref 3.4–10.8)

## 2024-10-01 PROCEDURE — 84443 ASSAY THYROID STIM HORMONE: CPT | Performed by: FAMILY MEDICINE

## 2024-10-01 PROCEDURE — 85004 AUTOMATED DIFF WBC COUNT: CPT | Performed by: FAMILY MEDICINE

## 2024-10-01 PROCEDURE — 80061 LIPID PANEL: CPT | Performed by: FAMILY MEDICINE

## 2024-10-01 PROCEDURE — 36415 COLL VENOUS BLD VENIPUNCTURE: CPT | Performed by: FAMILY MEDICINE

## 2024-10-01 PROCEDURE — 93970 EXTREMITY STUDY: CPT

## 2024-10-01 PROCEDURE — 80053 COMPREHEN METABOLIC PANEL: CPT | Performed by: FAMILY MEDICINE

## 2025-06-24 ENCOUNTER — OFFICE VISIT (OUTPATIENT)
Dept: GASTROENTEROLOGY | Facility: CLINIC | Age: 65
End: 2025-06-24
Payer: COMMERCIAL

## 2025-06-24 VITALS
WEIGHT: 220 LBS | BODY MASS INDEX: 35.36 KG/M2 | HEIGHT: 66 IN | HEART RATE: 91 BPM | SYSTOLIC BLOOD PRESSURE: 119 MMHG | DIASTOLIC BLOOD PRESSURE: 77 MMHG

## 2025-06-24 DIAGNOSIS — K21.9 GASTROESOPHAGEAL REFLUX DISEASE, UNSPECIFIED WHETHER ESOPHAGITIS PRESENT: Primary | ICD-10-CM

## 2025-06-24 DIAGNOSIS — Z86.19 HISTORY OF CLOSTRIDIOIDES DIFFICILE COLITIS: ICD-10-CM

## 2025-06-24 DIAGNOSIS — R19.7 DIARRHEA, UNSPECIFIED TYPE: ICD-10-CM

## 2025-06-24 DIAGNOSIS — Z90.49 S/P CHOLECYSTECTOMY: ICD-10-CM

## 2025-06-24 PROCEDURE — 99214 OFFICE O/P EST MOD 30 MIN: CPT | Performed by: NURSE PRACTITIONER

## 2025-06-24 RX ORDER — FUROSEMIDE 20 MG/1
20 TABLET ORAL DAILY
COMMUNITY

## 2025-06-24 RX ORDER — LEVOTHYROXINE SODIUM 200 UG/1
200 TABLET ORAL
COMMUNITY

## 2025-06-24 RX ORDER — FERROUS SULFATE 324(65)MG
324 TABLET, DELAYED RELEASE (ENTERIC COATED) ORAL
COMMUNITY

## 2025-06-24 RX ORDER — TIRZEPATIDE 2.5 MG/.5ML
2.5 INJECTION, SOLUTION SUBCUTANEOUS WEEKLY
COMMUNITY

## 2025-06-24 RX ORDER — PANTOPRAZOLE SODIUM 40 MG/1
40 TABLET, DELAYED RELEASE ORAL DAILY
Qty: 90 TABLET | Refills: 3 | Status: SHIPPED | OUTPATIENT
Start: 2025-06-24

## 2025-06-24 RX ORDER — COLESTIPOL HYDROCHLORIDE 1 G/1
1 TABLET ORAL DAILY
Qty: 90 TABLET | Refills: 3 | Status: SHIPPED | OUTPATIENT
Start: 2025-06-24

## 2025-06-24 NOTE — PROGRESS NOTES
DATE:  6/24/2025    DIAGNOSIS: Diarrhea    CHIEF COMPLAINT:  Chief Complaint   Patient presents with    Diarrhea     HPI:  Ms. Jasso has previously been following with Tano Amaya PA-C and was last seen in February 2023.  Please see previous notes for prior management.  In review of her records, patient has had chronic difficulty with diarrhea.  Of note, she is s/p cholecystectomy.  Therefore, she was started on trial of Colestid for suspected bile acid diarrhea which was has been helpful.She previously underwent colonoscopy with Dr. Sanchez on 11/3/2022 which showed grade 1 internal hemorrhoids, otherwise unremarkable.  Given improvement in diarrhea with Colestid, random colon biopsies were not performed. Patient does have personal history of colon polyps.  Therefore, repeat colonoscopy was recommended in 5 years.  Since her last visit, clinically she has continued to do well.  She is taking Colestid 1 g p.o. daily with improvement in diarrhea.  She reports having 1-2 BMs per day with stool type VI-VII on BSS.  She is also taking Metamucil daily.  Denies having melena or bright red bleeding per rectum.  Although diarrhea has improved, she would like to have diarrhea less often.  She has noticed salads, dairy and fruit/veggies exacerbate diarrhea.  Patient reports having history of C. difficile several years ago.  However, this has not been a recurrent issue.  She is also eating yogurt with live active cultures daily.  She reports GERD is well-controlled with Protonix 40 mg p.o. daily.  She has no other complaints today.    INTERVAL HISTORY:  Ms. Jasso presents today for follow-up.  Since her last visit, she has remained on Colestid 1 g p.o. daily which she feels is controlling diarrhea overall well.  She also continues to take a daily probiotic given history of C. difficile.  At present, she reports having~2 BMs per day with stool type VI-VII on [place] stool scale.  She feels as if she is evacuating her  colon well.  Of note, she reports being started on GLP-1 therapy-Zepbound approximately 3 months ago for weight loss.  She has lost~40 lbs with Zepbound and has not noticed constipation since starting GLP-1 therapy.  In regards to GERD, this remains well-controlled with Protonix 40 mg p.o. daily.  She denies having any recent flares.  She has no other complaints today.    PAST MEDICAL HISTORY:  Past Medical History:   Diagnosis Date    Anxiety     Brain tumor     brain spur    Brain tumor     C. difficile colitis     Carpal tunnel syndrome on both sides     Disease of thyroid gland     s/p thyroidectomy     Dyspnea     GERD (gastroesophageal reflux disease)     Hemorrhoids     IBS (irritable colon syndrome)     IBS with diarrhea    Kidney infection     treated recently-completed all antibiotics     Loss, vision, sudden     episodes of vision loss-'black out vision'-reason unknown     Memory problem     Migraine     Near syncope     reason unknown     Nerve pain     Rash     this summer-rash of unknown origin-has used a cream prn     Skin rash     BLE/BUE with scattered red bumps, pt states present since Spring 2016    Swelling of ankle     Vitamin D deficiency        PAST SURGICAL HISTORY:  Past Surgical History:   Procedure Laterality Date    ABDOMINAL SURGERY      BREAST BIOPSY Left 5/10/2022    Procedure: BREAST BIOPSY;  Surgeon: Tea Mack MD;  Location: [facility] OR;  Service: General;  Laterality: Left;     SECTION      CHOLECYSTECTOMY N/A 2021    Procedure: CHOLECYSTECTOMY LAPAROSCOPIC;  Surgeon: Guillaume Fortune MD;  Location: [facility] OR;  Service: General;  Laterality: N/A;    COLONOSCOPY      COLONOSCOPY N/A 11/3/2022    Procedure: COLONOSCOPY;  Surgeon: Ines Yañez MD;  Location: [facility] OR;  Service: Gastroenterology;  Laterality: N/A;    COLONOSCOPY W/ POLYPECTOMY      ENDOSCOPY      HYSTERECTOMY      benign    OTHER SURGICAL HISTORY      right lateral femoral  cutaneous nerve transection at the inguinal ligament     PERONEAL NERVE DECOMPRESSION Right 08/19/2016    Procedure: RIGHT LATERAL FEMORAL CUTANEOUS NERVE TRANSECTION AT LIGUINAL LIGAMENT;  Surgeon: Ethan Tate MD;  Location: [facility];  Service:     THYROID SURGERY      removed because 'large'    TONSILLECTOMY         SOCIAL HISTORY:  Social History     Socioeconomic History    Marital status:    Tobacco Use    Smoking status: Never    Smokeless tobacco: Never   Vaping Use    Vaping status: Never Used   Substance and Sexual Activity    Alcohol use: No    Drug use: No    Sexual activity: Defer     Partners: Male     Birth control/protection: Surgical       FAMILY HISTORY:  Family History   Problem Relation Age of Onset    Diabetes Mother     Heart disease Mother     Diabetes Father     Heart disease Father     Breast cancer Neg Hx        MEDICATIONS:  The current medication list was reviewed in the EMR    Current Outpatient Medications:     hydrocortisone (ANUSOL-HC) 25 MG suppository, Insert 1 suppository into the rectum 2 (Two) Times a Day As Needed for Hemorrhoids (rectal discomfort)., Disp: 30 suppository, Rfl: 5    levothyroxine (SYNTHROID, LEVOTHROID) 200 MCG tablet, Take 1 tablet by mouth Daily., Disp: , Rfl:     levothyroxine (SYNTHROID, LEVOTHROID) 25 MCG tablet, Take 1 tablet by mouth Every Other Day., Disp: , Rfl:     montelukast (SINGULAIR) 10 MG tablet, , Disp: , Rfl:     pantoprazole (PROTONIX) 40 MG EC tablet, Take 1 tablet by mouth Daily., Disp: , Rfl:     verapamil SR (CALAN-SR) 240 MG CR tablet, Take 1 tablet by mouth Daily., Disp: 30 tablet, Rfl: 3    ALLERGIES:    Allergies   Allergen Reactions    Poison Ivy Extract Hives and Rash     Skin blisters      Poison Oak Extract Hives and Rash     Skin blisters      REVIEW OF SYSTEMS:    A comprehensive 14 point review of systems was performed.  Significant findings as mentioned above.  All other systems reviewed and are negative.         Physical Exam   Vital Signs:   Vitals:    06/24/25 0802   BP: 119/77   Pulse: 91    General: Well developed, well nourished, alert and oriented x 3, in no acute distress.   Head: ATNC   Eyes: PERRL, No evidence of conjunctivitis.   Nose: No nasal discharge.   Mouth: Oral mucosal membranes moist. No oral ulceration or hemorrhages.   Neck: Neck supple. No thyromegaly. No JVD.   Lungs: Clear in all fields to A&P without rales, rhonchi or wheezing.   Heart: Regular rate and rhythm. No murmurs, rubs, or gallops.   Abdomen: Soft. Bowel sounds are normoactive. Nontender with palpation.   Extremities: No cyanosis or edema.   Neurologic: MS as above. Grossly non focal exam.      RECENT LABS:  Lab Results   Component Value Date    WBC 9.6 03/31/2025    HGB 9.6 (L) 03/31/2025    HCT 33 (L) 03/31/2025    MCV 71 (L) 03/31/2025    RDW 16 (H) 03/31/2025     03/31/2025    NEUTRORELPCT 70 03/31/2025    LYMPHORELPCT 20 03/31/2025    MONORELPCT 7 03/31/2025    EOSRELPCT 2 03/31/2025    BASORELPCT 1 03/31/2025    NEUTROABS 6.8 03/31/2025    LYMPHSABS 1.9 03/31/2025       Lab Results   Component Value Date     10/01/2024    K 3.4 (L) 10/01/2024    CO2 25.3 10/01/2024     10/01/2024    BUN 12 10/01/2024    CREATININE 0.73 10/01/2024    EGFRIFNONA 76 11/17/2021    GLUCOSE 109 (H) 10/01/2024    CALCIUM 8.8 10/01/2024    ALKPHOS 101 10/01/2024    AST 18 10/01/2024    ALT 19 10/01/2024    BILITOT 0.3 10/01/2024    ALBUMIN 4.1 10/01/2024    PROTEINTOT 7.0 10/01/2024    MG 1.8 06/28/2015    PHOS 3.2 06/28/2015       ASSESSMENT & PLAN:  Trevon Jasso is a very pleasant 65 y.o. female with    1.  Diarrhea:  2.  S/p cholecystectomy:  3.  History of C. difficile:    -As above, diarrhea has been a chronic issue. She was started on Colestid for suspected bile acid diarrhea which has been controlling diarrhea well.  Continue Colestid 1 g p.o. daily.  She has been advised to monitor for signs and symptoms of constipation and  how to adjust dose if needed.  Continue daily probiotic given history of C. difficile.  Of note, she was recently started on GLP-1 therapy for weight loss.  Discussed with patient how these medications will slow the digestive system and can cause constipation as well.  Also discussed with patient, she could consider adding daily fiber supplement to help bulk stool and promote bowel regularity.  -Of note, she previously underwent colonoscopy with Dr. Sanchez on 11/3/2022 which showed grade 1 internal hemorrhoids, otherwise unremarkable.  Given improvement in diarrhea with Colestid, random colon biopsies were not performed. Patient does have personal history of colon polyps.  Therefore, repeat colonoscopy was recommended in 5 years.  We reviewed findings today.    4.  GERD:  - Continue Protonix 40 mg p.o. daily which is controlling GERD well. Refills provided.  Will monitor.    - Patient would like to return to clinic in 1 year for symptom check.  In the interim, she was advised to notify clinic if she should have any new or worsening of symptoms.      Electronically Signed by: BLADIMIR Wagoner ,  June 24, 2025 08:05 EDT       CC:   Wily Cobb MD

## (undated) DEVICE — ENDOPATH ELECTROSURGERY PROBE PLUS II PISTOL HAND CONTROL PISTOL GRIP HANDLES WITH SUCTION AND IRRRIGATION FOR HAND CONTROL MONOPOLAR ELCTROSURGERY USE ONLY WITH PROBE PLUS II SHAFTS: Brand: ENDOPATH

## (undated) DEVICE — ANTIBACTERIAL UNDYED BRAIDED (POLYGLACTIN 910), SYNTHETIC ABSORBABLE SUTURE: Brand: COATED VICRYL

## (undated) DEVICE — CYSTO/BLADDER IRRIGATION SET, REGULATING CLAMP

## (undated) DEVICE — ELECTRD NDL MEGADYNE EZCLEAN NOSE 7CM

## (undated) DEVICE — HOLDER: Brand: DEROYAL

## (undated) DEVICE — CONN Y IRR DISP 1P/U

## (undated) DEVICE — SUT VIC 3/0 SH 27IN J416H

## (undated) DEVICE — ENDOPATH ELECTROSURGERY PROBE PLUS II 5MM RIGHT ANGLE MONOPOLAR ELECTROSURGERY SUCTION AND IRRRIGATION SHAFTS WITH RIGHT ANGLE ELECTRODE - USE ONLY WITH PROBE PLUS II HANDLES: Brand: ENDOPATH

## (undated) DEVICE — KT INK TISS MARGINMARKER STD 6COLOR

## (undated) DEVICE — DEV TRNSPEC

## (undated) DEVICE — PK LAP GEN 70

## (undated) DEVICE — APPL CHLORAPREP HI/LITE 26ML ORNG

## (undated) DEVICE — DRP UTIL 2/LAYR W/TP 15X26IN STRL PK/4

## (undated) DEVICE — Device: Brand: DEFENDO AIR/WATER/SUCTION AND BIOPSY VALVE

## (undated) DEVICE — ENDOCUT SCISSOR TIP, DISPOSABLE: Brand: RENEW

## (undated) DEVICE — TROCAR: Brand: KII FIOS FIRST ENTRY

## (undated) DEVICE — AMD ANTIMICROBIAL NON-ADHERENT ISLAND DRESSING,0.2% POLYHEXAMETHYLENE BIGUANIDE HCI (PHMB): Brand: TELFA

## (undated) DEVICE — GLV SURG PREMIERPRO MIC LTX PF SZ7 BRN

## (undated) DEVICE — TROCAR: Brand: KII® SLEEVE

## (undated) DEVICE — UNDYED BRAIDED (POLYGLACTIN 910), SYNTHETIC ABSORBABLE SUTURE: Brand: COATED VICRYL

## (undated) DEVICE — BRA COMPR CURAD P/OP 3XL

## (undated) DEVICE — ENDOGATOR TUBING FOR ENDOGATOR EGP-100 IRRIGATION PUMP,OLYMPUS OFP PUMP, OLYMPUS AFU-100 PUMP AND ERBE EIP2 PUMP: Brand: ENDOGATOR

## (undated) DEVICE — PK BASIC 70

## (undated) DEVICE — PATIENT RETURN ELECTRODE, SINGLE-USE, CONTACT QUALITY MONITORING, ADULT, WITH 9FT CORD, FOR PATIENTS WEIGING OVER 33LBS. (15KG): Brand: MEGADYNE

## (undated) DEVICE — GLV SURG PREMIERPRO MIC LTX PF SZ7.5 BRN

## (undated) DEVICE — Device

## (undated) DEVICE — SUT VIC 0/0 CT1 27IN J260H

## (undated) DEVICE — SUT VIC 4/0 P3 18IN J494G

## (undated) DEVICE — ST TBG PNEUMOCLEAR EVAC SMOKE HIFLO

## (undated) DEVICE — DRAPE,T,LAPARO,TRANS,STERILE: Brand: MEDLINE

## (undated) DEVICE — SUT VIC 2/0 UR6 27IN J602H

## (undated) DEVICE — SUT SILK 2/0 SH 30IN K833H

## (undated) DEVICE — DRAPE,UTILTY,TAPE,15X26, 4EA/PK: Brand: MEDLINE

## (undated) DEVICE — ENDOGATOR AUXILIARY WATER JET CONNECTOR: Brand: ENDOGATOR

## (undated) DEVICE — ELECTRD BLD EZ CLN MOD 4IN

## (undated) DEVICE — ENDOPATH XCEL BLADELESS TROCARS WITH STABILITY SLEEVES: Brand: ENDOPATH XCEL

## (undated) DEVICE — TBG PENCL TELESCP MEGADYNE SMOKE EVAC 10FT